# Patient Record
Sex: FEMALE | Race: WHITE | Employment: OTHER | URBAN - METROPOLITAN AREA
[De-identification: names, ages, dates, MRNs, and addresses within clinical notes are randomized per-mention and may not be internally consistent; named-entity substitution may affect disease eponyms.]

---

## 2022-11-21 RX ORDER — CALCIUM CARBONATE 500(1250)
500 TABLET ORAL DAILY
COMMUNITY

## 2022-11-21 RX ORDER — UBIDECARENONE 75 MG
50 CAPSULE ORAL DAILY
COMMUNITY

## 2022-11-21 RX ORDER — ROSUVASTATIN CALCIUM 10 MG/1
10 TABLET, COATED ORAL DAILY
COMMUNITY

## 2022-11-21 RX ORDER — M-VIT,TX,IRON,MINS/CALC/FOLIC 27MG-0.4MG
1 TABLET ORAL DAILY
COMMUNITY

## 2022-11-21 RX ORDER — ASPIRIN 81 MG/1
81 TABLET ORAL DAILY
Status: ON HOLD | COMMUNITY
End: 2022-11-26 | Stop reason: HOSPADM

## 2022-11-21 RX ORDER — SERTRALINE HYDROCHLORIDE 25 MG/1
25 TABLET, FILM COATED ORAL NIGHTLY
COMMUNITY

## 2022-11-21 RX ORDER — ACETAMINOPHEN 160 MG
TABLET,DISINTEGRATING ORAL DAILY
COMMUNITY

## 2022-11-21 NOTE — PROGRESS NOTES
[facility] PRE-ADMISSION TESTING INSTRUCTIONS    The Preadmission Testing patient is instructed accordingly using the following criteria (check applicable):    ARRIVAL INSTRUCTIONS:  [x] Parking the day of Surgery is located in the Main Entrance lot. Upon entering the door, make an immediate right to the surgery reception desk    [x] Bring photo ID and insurance card    [] Bring in a copy of Living will or Durable Power of  papers. [x] Please be sure to arrange for responsible adult to provide transportation to and from the hospital    [x] Please arrange for responsible adult to be with you for the 24 hour period post procedure due to having anesthesia    [x] If you awake am of surgery not feeling well or have temperature >100 please call [phone]    GENERAL INSTRUCTIONS:    [x] Nothing by mouth after midnight, including gum, candy, mints or water    [x] You may brush your teeth, but do not swallow any water    [] Take medications as instructed with 1-2 oz of water    [x] Stop herbal supplements and vitamins 5 days prior to procedure    [x] Follow preop dosing of blood thinners per physician instructions    [] Take 1/2 dose of evening insulin, but no insulin after midnight    [] No oral diabetic medications after midnight    [] If diabetic and have low blood sugar or feel symptomatic, take 1-2oz apple juice only    [] Bring inhalers day of surgery    [] Bring C-PAP/ Bi-Pap day of surgery    [] Bring urine specimen day of surgery    [x] Shower or bath with soap, lather and rinse well, AM of Surgery, no lotion, powders or creams to surgical site    [] Follow bowel prep as instructed per surgeon    [x] No tobacco products within 24 hours of surgery     [x] No alcohol or illegal drug use within 24 hours of surgery.     [x] Jewelry, body piercing's, eyeglasses, contact lenses and dentures are not permitted into surgery (bring cases)      [x] Please do not wear any nail polish, make up or hair products on the day of surgery    [x] You can expect a call the business day prior to procedure to notify you if your arrival time changes    [x] If you receive a survey after surgery we would greatly appreciate your comments    [] Parent/guardian of a minor must accompany their child and remain on the premises  the entire time they are under our care     [] Pediatric patients may bring favorite toy, blanket or comfort item with them    [] A caregiver or family member must remain with the patient during their stay if they are mentally handicapped, have dementia, disoriented or unable to use a call light or would be a safety concern if left unattended    [x] Please notify surgeon if you develop any illness between now and time of surgery (cold, cough, sore throat, fever, nausea, vomiting) or any signs of infections  including skin, wounds, and dental.    [x]  The Outpatient Pharmacy is available to fill your prescription here on your day of surgery, ask your preop nurse for details    [] Other instructions    EDUCATIONAL MATERIALS PROVIDED:    [] PAT Preoperative Education Packet/Booklet     [] Medication List    [] Transfusion bracelet applied with instructions    [] Shower with soap, lather and rinse well, and use CHG wipes provided the evening before surgery as instructed    [] Incentive spirometer with instructions

## 2022-11-25 ENCOUNTER — HOSPITAL ENCOUNTER (OUTPATIENT)
Dept: GENERAL RADIOLOGY | Age: 77
Setting detail: OUTPATIENT SURGERY
Discharge: HOME | End: 2022-11-27
Attending: GENERAL ACUTE CARE HOSPITAL
Payer: MEDICARE

## 2022-11-25 ENCOUNTER — HOSPITAL ENCOUNTER (INPATIENT)
Age: 77
LOS: 4 days | Discharge: HOME HEALTH | DRG: 517 | End: 2022-12-03
Attending: GENERAL ACUTE CARE HOSPITAL | Admitting: GENERAL ACUTE CARE HOSPITAL
Payer: MEDICARE

## 2022-11-25 ENCOUNTER — ANESTHESIA EVENT (OUTPATIENT)
Dept: OPERATING ROOM | Age: 77
End: 2022-11-25
Payer: MEDICARE

## 2022-11-25 ENCOUNTER — ANESTHESIA (OUTPATIENT)
Dept: OPERATING ROOM | Age: 77
End: 2022-11-25
Payer: MEDICARE

## 2022-11-25 DIAGNOSIS — R52 PAIN: ICD-10-CM

## 2022-11-25 DIAGNOSIS — S82.031D: Primary | ICD-10-CM

## 2022-11-25 LAB
EKG ATRIAL RATE: 64 BPM
EKG P AXIS: 46 DEGREES
EKG P-R INTERVAL: 156 MS
EKG Q-T INTERVAL: 428 MS
EKG QRS DURATION: 84 MS
EKG QTC CALCULATION (BAZETT): 441 MS
EKG R AXIS: 12 DEGREES
EKG T AXIS: 22 DEGREES
EKG VENTRICULAR RATE: 64 BPM

## 2022-11-25 PROCEDURE — 3209999900 FLUORO FOR SURGICAL PROCEDURES

## 2022-11-25 PROCEDURE — 6360000002 HC RX W HCPCS

## 2022-11-25 PROCEDURE — 93005 ELECTROCARDIOGRAM TRACING: CPT | Performed by: ANESTHESIOLOGY

## 2022-11-25 PROCEDURE — 2500000003 HC RX 250 WO HCPCS: Performed by: NURSE ANESTHETIST, CERTIFIED REGISTERED

## 2022-11-25 PROCEDURE — 6360000002 HC RX W HCPCS: Performed by: ANESTHESIOLOGY

## 2022-11-25 PROCEDURE — 0QSD04Z REPOSITION RIGHT PATELLA WITH INTERNAL FIXATION DEVICE, OPEN APPROACH: ICD-10-PCS | Performed by: GENERAL ACUTE CARE HOSPITAL

## 2022-11-25 PROCEDURE — 64447 NJX AA&/STRD FEMORAL NRV IMG: CPT | Performed by: ANESTHESIOLOGY

## 2022-11-25 PROCEDURE — C1713 ANCHOR/SCREW BN/BN,TIS/BN: HCPCS | Performed by: GENERAL ACUTE CARE HOSPITAL

## 2022-11-25 PROCEDURE — 2720000010 HC SURG SUPPLY STERILE: Performed by: GENERAL ACUTE CARE HOSPITAL

## 2022-11-25 PROCEDURE — 2709999900 HC NON-CHARGEABLE SUPPLY: Performed by: GENERAL ACUTE CARE HOSPITAL

## 2022-11-25 PROCEDURE — 7100000001 HC PACU RECOVERY - ADDTL 15 MIN: Performed by: GENERAL ACUTE CARE HOSPITAL

## 2022-11-25 PROCEDURE — 2580000003 HC RX 258: Performed by: GENERAL ACUTE CARE HOSPITAL

## 2022-11-25 PROCEDURE — 3700000001 HC ADD 15 MINUTES (ANESTHESIA): Performed by: GENERAL ACUTE CARE HOSPITAL

## 2022-11-25 PROCEDURE — G0378 HOSPITAL OBSERVATION PER HR: HCPCS

## 2022-11-25 PROCEDURE — 6360000002 HC RX W HCPCS: Performed by: GENERAL ACUTE CARE HOSPITAL

## 2022-11-25 PROCEDURE — 3600000013 HC SURGERY LEVEL 3 ADDTL 15MIN: Performed by: GENERAL ACUTE CARE HOSPITAL

## 2022-11-25 PROCEDURE — 97165 OT EVAL LOW COMPLEX 30 MIN: CPT

## 2022-11-25 PROCEDURE — 7100000000 HC PACU RECOVERY - FIRST 15 MIN: Performed by: GENERAL ACUTE CARE HOSPITAL

## 2022-11-25 PROCEDURE — 6370000000 HC RX 637 (ALT 250 FOR IP): Performed by: GENERAL ACUTE CARE HOSPITAL

## 2022-11-25 PROCEDURE — 3600000003 HC SURGERY LEVEL 3 BASE: Performed by: GENERAL ACUTE CARE HOSPITAL

## 2022-11-25 PROCEDURE — C1769 GUIDE WIRE: HCPCS | Performed by: GENERAL ACUTE CARE HOSPITAL

## 2022-11-25 PROCEDURE — 3700000000 HC ANESTHESIA ATTENDED CARE: Performed by: GENERAL ACUTE CARE HOSPITAL

## 2022-11-25 PROCEDURE — 2580000003 HC RX 258: Performed by: NURSE ANESTHETIST, CERTIFIED REGISTERED

## 2022-11-25 PROCEDURE — 6360000002 HC RX W HCPCS: Performed by: NURSE ANESTHETIST, CERTIFIED REGISTERED

## 2022-11-25 PROCEDURE — 93010 ELECTROCARDIOGRAM REPORT: CPT | Performed by: INTERNAL MEDICINE

## 2022-11-25 DEVICE — IMPLANTABLE DEVICE: Type: IMPLANTABLE DEVICE | Site: KNEE | Status: FUNCTIONAL

## 2022-11-25 RX ORDER — M-VIT,TX,IRON,MINS/CALC/FOLIC 27MG-0.4MG
1 TABLET ORAL DAILY
Status: DISCONTINUED | OUTPATIENT
Start: 2022-11-25 | End: 2022-12-03 | Stop reason: HOSPADM

## 2022-11-25 RX ORDER — SODIUM CHLORIDE 0.9 % (FLUSH) 0.9 %
10 SYRINGE (ML) INJECTION PRN
Status: DISCONTINUED | OUTPATIENT
Start: 2022-11-25 | End: 2022-12-03 | Stop reason: HOSPADM

## 2022-11-25 RX ORDER — NEOSTIGMINE METHYLSULFATE 1 MG/ML
INJECTION, SOLUTION INTRAVENOUS PRN
Status: DISCONTINUED | OUTPATIENT
Start: 2022-11-25 | End: 2022-11-25 | Stop reason: SDUPTHER

## 2022-11-25 RX ORDER — SODIUM CHLORIDE 0.9 % (FLUSH) 0.9 %
5-40 SYRINGE (ML) INJECTION EVERY 12 HOURS SCHEDULED
Status: DISCONTINUED | OUTPATIENT
Start: 2022-11-25 | End: 2022-11-25 | Stop reason: HOSPADM

## 2022-11-25 RX ORDER — MIDAZOLAM HYDROCHLORIDE 1 MG/ML
INJECTION INTRAMUSCULAR; INTRAVENOUS
Status: COMPLETED
Start: 2022-11-25 | End: 2022-11-25

## 2022-11-25 RX ORDER — SODIUM CHLORIDE 0.9 % (FLUSH) 0.9 %
5-40 SYRINGE (ML) INJECTION PRN
Status: DISCONTINUED | OUTPATIENT
Start: 2022-11-25 | End: 2022-12-03 | Stop reason: HOSPADM

## 2022-11-25 RX ORDER — ROPIVACAINE HYDROCHLORIDE 5 MG/ML
INJECTION, SOLUTION EPIDURAL; INFILTRATION; PERINEURAL
Status: COMPLETED
Start: 2022-11-25 | End: 2022-11-25

## 2022-11-25 RX ORDER — CALCIUM CARBONATE 500(1250)
500 TABLET ORAL DAILY
Status: DISCONTINUED | OUTPATIENT
Start: 2022-11-25 | End: 2022-12-03 | Stop reason: HOSPADM

## 2022-11-25 RX ORDER — MIDAZOLAM HYDROCHLORIDE 2 MG/2ML
1 INJECTION, SOLUTION INTRAMUSCULAR; INTRAVENOUS PRN
Status: DISCONTINUED | OUTPATIENT
Start: 2022-11-25 | End: 2022-11-25 | Stop reason: HOSPADM

## 2022-11-25 RX ORDER — SODIUM CHLORIDE 9 MG/ML
INJECTION, SOLUTION INTRAVENOUS CONTINUOUS PRN
Status: DISCONTINUED | OUTPATIENT
Start: 2022-11-25 | End: 2022-11-25 | Stop reason: SDUPTHER

## 2022-11-25 RX ORDER — ROSUVASTATIN CALCIUM 10 MG/1
10 TABLET, COATED ORAL DAILY
Status: DISCONTINUED | OUTPATIENT
Start: 2022-11-25 | End: 2022-12-03 | Stop reason: HOSPADM

## 2022-11-25 RX ORDER — FENTANYL CITRATE 50 UG/ML
INJECTION, SOLUTION INTRAMUSCULAR; INTRAVENOUS PRN
Status: DISCONTINUED | OUTPATIENT
Start: 2022-11-25 | End: 2022-11-25 | Stop reason: SDUPTHER

## 2022-11-25 RX ORDER — ENOXAPARIN SODIUM 100 MG/ML
40 INJECTION SUBCUTANEOUS DAILY
Status: DISCONTINUED | OUTPATIENT
Start: 2022-11-25 | End: 2022-11-27

## 2022-11-25 RX ORDER — SODIUM CHLORIDE 9 MG/ML
INJECTION, SOLUTION INTRAVENOUS PRN
Status: DISCONTINUED | OUTPATIENT
Start: 2022-11-25 | End: 2022-12-03 | Stop reason: HOSPADM

## 2022-11-25 RX ORDER — OXYCODONE HYDROCHLORIDE 5 MG/1
10 TABLET ORAL EVERY 4 HOURS PRN
Status: DISCONTINUED | OUTPATIENT
Start: 2022-11-25 | End: 2022-12-03 | Stop reason: HOSPADM

## 2022-11-25 RX ORDER — DIMETHICONE, OXYBENZONE, AND PADIMATE O 2; 2.5; 6.6 G/100G; G/100G; G/100G
STICK TOPICAL
Status: DISPENSED
Start: 2022-11-25 | End: 2022-11-26

## 2022-11-25 RX ORDER — MEPERIDINE HYDROCHLORIDE 25 MG/ML
12.5 INJECTION INTRAMUSCULAR; INTRAVENOUS; SUBCUTANEOUS EVERY 5 MIN PRN
Status: DISCONTINUED | OUTPATIENT
Start: 2022-11-25 | End: 2022-11-25 | Stop reason: HOSPADM

## 2022-11-25 RX ORDER — SODIUM CHLORIDE 0.9 % (FLUSH) 0.9 %
5-40 SYRINGE (ML) INJECTION EVERY 12 HOURS SCHEDULED
Status: DISCONTINUED | OUTPATIENT
Start: 2022-11-25 | End: 2022-12-03 | Stop reason: HOSPADM

## 2022-11-25 RX ORDER — FENTANYL CITRATE 50 UG/ML
INJECTION, SOLUTION INTRAMUSCULAR; INTRAVENOUS
Status: COMPLETED
Start: 2022-11-25 | End: 2022-11-25

## 2022-11-25 RX ORDER — ONDANSETRON 4 MG/1
4 TABLET, ORALLY DISINTEGRATING ORAL EVERY 8 HOURS PRN
Status: DISCONTINUED | OUTPATIENT
Start: 2022-11-25 | End: 2022-12-03 | Stop reason: HOSPADM

## 2022-11-25 RX ORDER — ROPIVACAINE HYDROCHLORIDE 5 MG/ML
30 INJECTION, SOLUTION EPIDURAL; INFILTRATION; PERINEURAL ONCE
Status: COMPLETED | OUTPATIENT
Start: 2022-11-25 | End: 2022-11-25

## 2022-11-25 RX ORDER — ONDANSETRON 2 MG/ML
4 INJECTION INTRAMUSCULAR; INTRAVENOUS
Status: COMPLETED | OUTPATIENT
Start: 2022-11-25 | End: 2022-11-25

## 2022-11-25 RX ORDER — PROPOFOL 10 MG/ML
INJECTION, EMULSION INTRAVENOUS PRN
Status: DISCONTINUED | OUTPATIENT
Start: 2022-11-25 | End: 2022-11-25 | Stop reason: SDUPTHER

## 2022-11-25 RX ORDER — ROCURONIUM BROMIDE 10 MG/ML
INJECTION, SOLUTION INTRAVENOUS PRN
Status: DISCONTINUED | OUTPATIENT
Start: 2022-11-25 | End: 2022-11-25 | Stop reason: SDUPTHER

## 2022-11-25 RX ORDER — MORPHINE SULFATE 4 MG/ML
4 INJECTION, SOLUTION INTRAMUSCULAR; INTRAVENOUS
Status: DISCONTINUED | OUTPATIENT
Start: 2022-11-25 | End: 2022-12-03 | Stop reason: HOSPADM

## 2022-11-25 RX ORDER — SODIUM CHLORIDE 9 MG/ML
INJECTION, SOLUTION INTRAVENOUS PRN
Status: DISCONTINUED | OUTPATIENT
Start: 2022-11-25 | End: 2022-11-25 | Stop reason: HOSPADM

## 2022-11-25 RX ORDER — SODIUM CHLORIDE 9 MG/ML
INJECTION, SOLUTION INTRAVENOUS CONTINUOUS
Status: DISCONTINUED | OUTPATIENT
Start: 2022-11-25 | End: 2022-12-03 | Stop reason: HOSPADM

## 2022-11-25 RX ORDER — ONDANSETRON 2 MG/ML
INJECTION INTRAMUSCULAR; INTRAVENOUS PRN
Status: DISCONTINUED | OUTPATIENT
Start: 2022-11-25 | End: 2022-11-25 | Stop reason: SDUPTHER

## 2022-11-25 RX ORDER — POLYETHYLENE GLYCOL 3350 17 G/17G
17 POWDER, FOR SOLUTION ORAL DAILY PRN
Status: DISCONTINUED | OUTPATIENT
Start: 2022-11-25 | End: 2022-12-03 | Stop reason: HOSPADM

## 2022-11-25 RX ORDER — POLYETHYLENE GLYCOL 3350 17 G/17G
17 POWDER, FOR SOLUTION ORAL DAILY
Status: DISCONTINUED | OUTPATIENT
Start: 2022-11-25 | End: 2022-12-03 | Stop reason: HOSPADM

## 2022-11-25 RX ORDER — POTASSIUM CHLORIDE 20 MEQ/1
40 TABLET, EXTENDED RELEASE ORAL PRN
Status: DISCONTINUED | OUTPATIENT
Start: 2022-11-25 | End: 2022-12-03 | Stop reason: HOSPADM

## 2022-11-25 RX ORDER — CHOLECALCIFEROL (VITAMIN D3) 50 MCG
2000 TABLET ORAL DAILY
Status: DISCONTINUED | OUTPATIENT
Start: 2022-11-25 | End: 2022-12-03 | Stop reason: HOSPADM

## 2022-11-25 RX ORDER — DEXAMETHASONE SODIUM PHOSPHATE 4 MG/ML
INJECTION, SOLUTION INTRA-ARTICULAR; INTRALESIONAL; INTRAMUSCULAR; INTRAVENOUS; SOFT TISSUE PRN
Status: DISCONTINUED | OUTPATIENT
Start: 2022-11-25 | End: 2022-11-25 | Stop reason: SDUPTHER

## 2022-11-25 RX ORDER — HYDRALAZINE HYDROCHLORIDE 20 MG/ML
INJECTION INTRAMUSCULAR; INTRAVENOUS PRN
Status: DISCONTINUED | OUTPATIENT
Start: 2022-11-25 | End: 2022-11-25 | Stop reason: SDUPTHER

## 2022-11-25 RX ORDER — SODIUM CHLORIDE 0.9 % (FLUSH) 0.9 %
5-40 SYRINGE (ML) INJECTION PRN
Status: DISCONTINUED | OUTPATIENT
Start: 2022-11-25 | End: 2022-11-25 | Stop reason: HOSPADM

## 2022-11-25 RX ORDER — LIDOCAINE HYDROCHLORIDE 20 MG/ML
INJECTION, SOLUTION EPIDURAL; INFILTRATION; INTRACAUDAL; PERINEURAL PRN
Status: DISCONTINUED | OUTPATIENT
Start: 2022-11-25 | End: 2022-11-25 | Stop reason: SDUPTHER

## 2022-11-25 RX ORDER — ACETAMINOPHEN 650 MG/1
650 SUPPOSITORY RECTAL EVERY 6 HOURS PRN
Status: DISCONTINUED | OUTPATIENT
Start: 2022-11-25 | End: 2022-12-03 | Stop reason: HOSPADM

## 2022-11-25 RX ORDER — ONDANSETRON 2 MG/ML
4 INJECTION INTRAMUSCULAR; INTRAVENOUS EVERY 6 HOURS PRN
Status: DISCONTINUED | OUTPATIENT
Start: 2022-11-25 | End: 2022-12-03 | Stop reason: HOSPADM

## 2022-11-25 RX ORDER — ACETAMINOPHEN 325 MG/1
650 TABLET ORAL EVERY 6 HOURS PRN
Status: DISCONTINUED | OUTPATIENT
Start: 2022-11-25 | End: 2022-12-03 | Stop reason: HOSPADM

## 2022-11-25 RX ORDER — LABETALOL HYDROCHLORIDE 5 MG/ML
INJECTION, SOLUTION INTRAVENOUS PRN
Status: DISCONTINUED | OUTPATIENT
Start: 2022-11-25 | End: 2022-11-25 | Stop reason: SDUPTHER

## 2022-11-25 RX ORDER — UBIDECARENONE 75 MG
50 CAPSULE ORAL DAILY
Status: DISCONTINUED | OUTPATIENT
Start: 2022-11-25 | End: 2022-12-03 | Stop reason: HOSPADM

## 2022-11-25 RX ORDER — OXYCODONE HYDROCHLORIDE 5 MG/1
5 TABLET ORAL EVERY 4 HOURS PRN
Status: DISCONTINUED | OUTPATIENT
Start: 2022-11-25 | End: 2022-12-03 | Stop reason: HOSPADM

## 2022-11-25 RX ORDER — POTASSIUM CHLORIDE 7.45 MG/ML
10 INJECTION INTRAVENOUS PRN
Status: DISCONTINUED | OUTPATIENT
Start: 2022-11-25 | End: 2022-12-03 | Stop reason: HOSPADM

## 2022-11-25 RX ORDER — MORPHINE SULFATE 2 MG/ML
2 INJECTION, SOLUTION INTRAMUSCULAR; INTRAVENOUS
Status: DISCONTINUED | OUTPATIENT
Start: 2022-11-25 | End: 2022-12-03 | Stop reason: HOSPADM

## 2022-11-25 RX ORDER — FENTANYL CITRATE 50 UG/ML
50 INJECTION, SOLUTION INTRAMUSCULAR; INTRAVENOUS EVERY 10 MIN PRN
Status: DISCONTINUED | OUTPATIENT
Start: 2022-11-25 | End: 2022-11-25 | Stop reason: HOSPADM

## 2022-11-25 RX ORDER — GLYCOPYRROLATE 0.2 MG/ML
INJECTION INTRAMUSCULAR; INTRAVENOUS PRN
Status: DISCONTINUED | OUTPATIENT
Start: 2022-11-25 | End: 2022-11-25 | Stop reason: SDUPTHER

## 2022-11-25 RX ORDER — SODIUM CHLORIDE 9 MG/ML
INJECTION, SOLUTION INTRAVENOUS PRN
Status: DISCONTINUED | OUTPATIENT
Start: 2022-11-25 | End: 2022-11-25 | Stop reason: SDUPTHER

## 2022-11-25 RX ADMIN — LABETALOL HYDROCHLORIDE 5 MG: 5 INJECTION INTRAVENOUS at 11:25

## 2022-11-25 RX ADMIN — Medication 3 MG: at 11:20

## 2022-11-25 RX ADMIN — SODIUM CHLORIDE: 9 INJECTION, SOLUTION INTRAVENOUS at 09:37

## 2022-11-25 RX ADMIN — SODIUM CHLORIDE, PRESERVATIVE FREE 10 ML: 5 INJECTION INTRAVENOUS at 22:12

## 2022-11-25 RX ADMIN — FENTANYL CITRATE 50 MCG: 50 INJECTION, SOLUTION INTRAMUSCULAR; INTRAVENOUS at 08:59

## 2022-11-25 RX ADMIN — WATER 2000 MG: 1 INJECTION INTRAMUSCULAR; INTRAVENOUS; SUBCUTANEOUS at 22:12

## 2022-11-25 RX ADMIN — DEXAMETHASONE SODIUM PHOSPHATE 8 MG: 4 INJECTION, SOLUTION INTRAMUSCULAR; INTRAVENOUS at 10:01

## 2022-11-25 RX ADMIN — ONDANSETRON 4 MG: 2 INJECTION INTRAMUSCULAR; INTRAVENOUS at 12:31

## 2022-11-25 RX ADMIN — ASPIRIN 325 MG: 325 TABLET, COATED ORAL at 22:11

## 2022-11-25 RX ADMIN — LIDOCAINE HYDROCHLORIDE 60 MG: 20 INJECTION, SOLUTION EPIDURAL; INFILTRATION; INTRACAUDAL; PERINEURAL at 09:52

## 2022-11-25 RX ADMIN — WATER 2000 MG: 1 INJECTION INTRAMUSCULAR; INTRAVENOUS; SUBCUTANEOUS at 10:06

## 2022-11-25 RX ADMIN — ONDANSETRON 4 MG: 2 INJECTION INTRAMUSCULAR; INTRAVENOUS at 11:17

## 2022-11-25 RX ADMIN — FENTANYL CITRATE 50 MCG: 50 INJECTION, SOLUTION INTRAMUSCULAR; INTRAVENOUS at 09:51

## 2022-11-25 RX ADMIN — GLYCOPYRROLATE 0.6 MG: 0.2 INJECTION, SOLUTION INTRAMUSCULAR; INTRAVENOUS at 11:20

## 2022-11-25 RX ADMIN — ROPIVACAINE HYDROCHLORIDE 30 ML: 5 INJECTION, SOLUTION EPIDURAL; INFILTRATION; PERINEURAL at 09:11

## 2022-11-25 RX ADMIN — ROCURONIUM BROMIDE 30 MG: 10 INJECTION, SOLUTION INTRAVENOUS at 09:52

## 2022-11-25 RX ADMIN — FENTANYL CITRATE 50 MCG: 50 INJECTION INTRAMUSCULAR; INTRAVENOUS at 08:59

## 2022-11-25 RX ADMIN — MIDAZOLAM HYDROCHLORIDE 1 MG: 2 INJECTION, SOLUTION INTRAMUSCULAR; INTRAVENOUS at 08:59

## 2022-11-25 RX ADMIN — SODIUM CHLORIDE: 9 INJECTION, SOLUTION INTRAVENOUS at 10:57

## 2022-11-25 RX ADMIN — LABETALOL HYDROCHLORIDE 5 MG: 5 INJECTION INTRAVENOUS at 11:31

## 2022-11-25 RX ADMIN — MIDAZOLAM 1 MG: 1 INJECTION INTRAMUSCULAR; INTRAVENOUS at 08:59

## 2022-11-25 RX ADMIN — PROPOFOL 160 MG: 10 INJECTION, EMULSION INTRAVENOUS at 09:52

## 2022-11-25 RX ADMIN — HYDRALAZINE HYDROCHLORIDE 5 MG: 20 INJECTION, SOLUTION INTRAMUSCULAR; INTRAVENOUS at 10:59

## 2022-11-25 RX ADMIN — FENTANYL CITRATE 50 MCG: 50 INJECTION, SOLUTION INTRAMUSCULAR; INTRAVENOUS at 10:49

## 2022-11-25 ASSESSMENT — PAIN - FUNCTIONAL ASSESSMENT: PAIN_FUNCTIONAL_ASSESSMENT: 0-10

## 2022-11-25 ASSESSMENT — PAIN DESCRIPTION - DESCRIPTORS: DESCRIPTORS: SORE

## 2022-11-25 NOTE — ANESTHESIA POSTPROCEDURE EVALUATION
Department of Anesthesiology  Postprocedure Note    Patient: Sugar Camargo  MRN: [id number]  YOB: 1945  Date of evaluation: 11/25/2022      Procedure Summary     Date: 11/25/22 Room / Location: [facility] OR 07 / [facility]    Anesthesia Start: 1944 Anesthesia Stop: 5415    Procedure: RIGHT PATELLA OPEN REDUCTION INTERNAL FIXATION  ++PNB++ (Right: Knee) Diagnosis:       Closed displaced transverse fracture of right patella, initial encounter      (Closed displaced transverse fracture of right patella, initial encounter [S82.031A])    Surgeons: Alannah Phelps DO Responsible Provider: Gerri Osuna DO    Anesthesia Type: Regional, General ASA Status: 2          Anesthesia Type: Regional, General    Neftaly Phase I: Neftaly Score: 10    Neftaly Phase II:        Anesthesia Post Evaluation    Patient location during evaluation: PACU  Patient participation: complete - patient participated  Level of consciousness: awake  Pain score: 0  Airway patency: patent  Nausea & Vomiting: no nausea and no vomiting  Complications: no  Cardiovascular status: hemodynamically stable  Respiratory status: acceptable  Hydration status: euvolemic  Comments: Pt transferred to floor. RN states no c/o pain at time of transfer and no questions/concerns r/t anesthesia.

## 2022-11-25 NOTE — PROGRESS NOTES
Occupational Therapy    OCCUPATIONAL THERAPY INITIAL EVALUATION     [facility] [address] [place] [address]         JVIO:                                                  Patient Name: Marcin Carrington    MRN: [id number]    : 1945    Room: [address] [place] [address]      Evaluating OT: Earnestine Saldana OTR/L   [id number]      Referring Provider:Raad Sahu DO    Specific Provider Orders/Date:OT eval and treat 2022      Diagnosis:  Closed displaced transverse fracture of right patella, initial encounter [S82.031A]  Closed disp transverse fracture of right patella with routine healing [S82.031D]  sustained a fall from standing height in which she fell directly onto a flexed right knee.   X-rays revealed a completely displaced transverse fracture at the mid pole of the patella  2022  family has been staying with her to assist since then - they live out of town and have returned home    Procedure(s):  [address] [place] [address] 2022    Precautions:  Fall Risk, TTWB R LE, knee immobilizer      Assessment of current deficits    [x] Functional mobility  [x]ADLs  [x] Strength               []Cognition    [x] Functional transfers   [x] IADLs         [x] Safety Awareness   [x]Endurance    [] Fine Coordination              [x] Balance      [] Vision/perception   []Sensation     []Gross Motor Coordination  [] ROM  [] Delirium                   [] Motor Control     OT PLAN OF CARE   OT POC based on physician orders, patient diagnosis and results of clinical assessment    Frequency/Duration  2-4 days/wk for 2 weeks PRN   Specific OT Treatment Interventions to include:   ADL retraining/adapted techniques and AE recommendations to increase functional independence within precautions                    Energy conservation techniques to improve tolerance for selfcare routine   Functional transfer/mobility training/DME recommendations for increased independence, safety and fall prevention         Patient/family education to increase safety and functional independence             Environmental modifications for safe mobility and completion of ADLs                             Therapeutic activity to improve functional performance during ADLs. Therapeutic exercise to improve tolerance and functional strength for ADLs    Balance retraining/tolerance tasks for facilitation of postural control with dynamic challenges during ADLs .       Positioning to improve functional independence  []    Recommended Adaptive Equipment: TBD     Home Living: Pt lives alone, 1 story with 4 steps/rail    Bathroom setup: walk in shower, grab bars, shower seat    Equipment owned: wheeled walker     Prior Level of Function: assist with ADLs , assist  with IADLs; ambulated with walker     Pain Level: no pain ;   Cognition: A&O: 4/4;    Memory:  goood    Sequencing:  good    Problem solving:  good    Judgement/safety:  good      Functional Assessment:  AM-PAC Daily Activity Raw Score: 16/24   Initial Eval Status  Date: 11/25/22 Treatment Status  Date: STGs = LTGs  Time frame: 10-14 days   Feeding Independent      Grooming Set-up ,seated   Independent    UB Dressing Set-up   Independent    LB Dressing Mod A   Mod I    Bathing Min A   Mod I    Toileting SBA  Seated on commode   Mod I    Bed Mobility  SBA  Supine <> sit   Mod I    Functional Transfers CGA  Sit - stand from bed, BSC   Mod I    Functional Mobility CGA,w/walker   Knee immobilizer on   SPT to<> from bed/BSC  Able to side step to [facility] - patient educated on TTWB - leary of putting to much weight down so patient stayed NWB and hopped the short distance   Continue to reinforce TTWB precaution   Mod I  with good tolerance    Balance Sitting:     Static:  Independent     Dynamic:SBA   Standing: CGA   Independent    Activity Tolerance No SOB   Good  with ADL activity    Visual/  Perceptual Glasses: no                Hand Dominance right   AROM (PROM) Strength Additional Info:    RUE  WFL WFL good  and wfl FMC/dexterity noted during ADL tasks       LUE WFL WFL good  and wfl FMC/dexterity noted during ADL tasks       Hearing: WFL   Sensation:  No c/o numbness or tingling   Tone: WFL   Edema: none observed     Comments: Upon arrival patient lying in bed . At end of session, patient returned to bed  with call light and phone within reach, all lines and tubes intact. *ALARM ON     Overall patient demonstrated  decreased independence and safety during completion of ADL/functional transfer/mobility tasks. Pt would benefit from continued skilled OT to increase safety and independence with completion of ADL/IADL tasks for functional independence and quality of life. Rehab Potential: good for established goals     Patient / Family Goal: rehab   - patient nervous about returning home alone - her family was assisting her prior - they are from out of town and have returned home     Patient and/or family were instructed on functional diagnosis, prognosis/goals and OT plan of care. Demonstrated good  understanding. Eval Complexity: Low    Time In: 1530  Time Out: 1550      Min Units   OT Eval Low 97165 x  1   OT Eval Medium 37491      OT Eval High 40794      OT Re-Eval [id number]       Therapeutic Ex 74300      Therapeutic Activities 35914       ADL/Self Care 48805       Orthotic Management 86384       Manual [id number]     Neuro Re-Ed 41904       Non-Billable Time         Evaluation Time additionally includes thorough review of current medical information, gathering information on past medical history/social history and prior level of function, interpretation of standardized testing/informal observation of tasks, assessment of data and development of plan of care and goals.             Kaylin Singh  OTR/L  OT [id number]

## 2022-11-25 NOTE — ANESTHESIA PRE PROCEDURE
Department of Anesthesiology  Preprocedure Note       Name:  Miller Hill   Age:  69 y.o.  :  1945                                          MRN:  [id number]         Date:  2022      Surgeon: Laura Jeter):  Mook Benjamin DO    Procedure: Procedure(s):  RIGHT PATELLA OPEN REDUCTION INTERNAL FIXATION  ++ARTHREX++  ++PNB++    Medications prior to admission:   Prior to Admission medications    Medication Sig Start Date End Date Taking?  Authorizing Provider   rosuvastatin (CRESTOR) 10 MG tablet Take 10 mg by mouth daily   Yes Historical Provider, MD   sertraline (ZOLOFT) 25 MG tablet Take 25 mg by mouth at bedtime   Yes Historical Provider, MD   Multiple Vitamins-Minerals (THERAPEUTIC MULTIVITAMIN-MINERALS) tablet Take 1 tablet by mouth daily   Yes Historical Provider, MD   aspirin 81 MG EC tablet Take 81 mg by mouth daily   Yes Historical Provider, MD   Cholecalciferol (VITAMIN D3) 50 MCG (2000) CAPS Take by mouth daily   Yes Historical Provider, MD   COLLAGEN PO Take by mouth daily   Yes Historical Provider, MD   calcium carbonate (OSCAL) 500 MG TABS tablet Take 500 mg by mouth daily   Yes Historical Provider, MD   vitamin B-12 (CYANOCOBALAMIN) 100 MCG tablet Take 50 mcg by mouth daily   Yes Historical Provider, MD       Current medications:    Current Facility-Administered Medications   Medication Dose Route Frequency Provider Last Rate Last Admin    sodium chloride flush 0.9 % injection 5-40 mL  5-40 mL IntraVENous 2 times per day Mook Benjamin DO        sodium chloride flush 0.9 % injection 5-40 mL  5-40 mL IntraVENous PRN Mook Benjamin, DO        0.9 % sodium chloride infusion   IntraVENous PRN Mook Benjamin,         ceFAZolin (ANCEF) 2,000 mg in sterile water 20 mL IV syringe  2,000 mg IntraVENous On Call to [address], DO        midazolam (VERSED) 2 MG/2ML injection             ropivacaine (NAROPIN) 0.5% injection             fentaNYL (SUBLIMAZE) injection 50 mcg  50 mcg IntraVENous Q10 Min PRN Marden Cap Erlen, DO        midazolam PF (VERSED) injection 1 mg  1 mg IntraVENous PRN Marden Cap Herbert, DO        ropivacaine (NAROPIN) 0.5% injection 30 mL  30 mL Infiltration Once Yaquelin Hoots, DO           Allergies:  No Known Allergies    Problem List:  There is no problem list on file for this patient. Past Medical History:        Diagnosis Date    Arthritis     Fracture of right patella     Hx of blood clots     Hyperlipidemia     PONV (postoperative nausea and vomiting)        Past Surgical History:        Procedure Laterality Date    CATARACT EXTRACTION EXTRACAPSULAR W/ INTRAOCULAR LENS IMPLANTATION Bilateral     COLONOSCOPY      TONSILLECTOMY         Social History:    Social History     Tobacco Use    Smoking status: Former     Types: Cigarettes    Smokeless tobacco: Never   Substance Use Topics    Alcohol use: Never                                Counseling given: Not Answered      Vital Signs (Current):   Vitals:    11/21/22 1350 11/25/22 0714   BP:  (!) 150/67   Pulse:  76   Resp:  16   Temp:  97.4 °F (36.3 °C)   SpO2:  95%   Weight: 162 lb (73.5 kg) 162 lb (73.5 kg)   Height: 5' 6\" (1.676 m) 5' 6\" (1.676 m)                                              BP Readings from Last 3 Encounters:   11/25/22 (!) 150/67       NPO Status: Time of last liquid consumption: 1800                        Time of last solid consumption: 1800                        Date of last liquid consumption: 11/24/22                        Date of last solid food consumption: 11/24/22    BMI:   Wt Readings from Last 3 Encounters:   11/25/22 162 lb (73.5 kg)     Body mass index is 26.15 kg/m².     CBC: No results found for: WBC, RBC, HGB, HCT, MCV, RDW, PLT    CMP: No results found for: NA, K, CL, CO2, BUN, CREATININE, GFRAA, AGRATIO, LABGLOM, GLUCOSE, GLU, PROT, CALCIUM, BILITOT, ALKPHOS, AST, ALT    POC Tests: No results for input(s): POCGLU, POCNA, POCK, POCCL, POCBUN, Nathaly Gonsalezer in the last 72 hours. Coags: No results found for: PROTIME, INR, APTT    HCG (If Applicable): No results found for: PREGTESTUR, PREGSERUM, HCG, HCGQUANT     ABGs: No results found for: PHART, PO2ART, UGC4ERE, CRR8TUZ, BEART, Y1OGUUZG     Type & Screen (If Applicable):  No results found for: LABABO, LABRH    Drug/Infectious Status (If Applicable):  No results found for: HIV, HEPCAB    COVID-19 Screening (If Applicable): No results found for: COVID19        Anesthesia Evaluation  Patient summary reviewed and Nursing notes reviewed   history of anesthetic complications: PONV. Airway: Mallampati: II  TM distance: >3 FB   Neck ROM: full  Mouth opening: > = 3 FB   Dental: normal exam         Pulmonary:Negative Pulmonary ROS breath sounds clear to auscultation                             Cardiovascular:Negative CV ROS            Rhythm: regular  Rate: normal                    Neuro/Psych:   (+) depression/anxiety             GI/Hepatic/Renal: Neg GI/Hepatic/Renal ROS            Endo/Other:                      ROS comment: S/P fall resulting in Rt. Patella fx Abdominal:             Vascular: negative vascular ROS. Other Findings:           Anesthesia Plan      general and regional     ASA 2     (Discussed with pt. R & B of Rt. Femoral nerve block for postop pain, sg request.  Pt. Agrees to this.)        Anesthetic plan and risks discussed with patient. Plan discussed with CRNA. Nikki Biggs DO   11/25/2022      Patient seen and examined, chart reviewed, agree with above findings. Anesthetic plan, risks, benefits, alternatives, and personnel involved discussed with patient. Patient verbalized an understanding and agreed to proceed. NPO status confirmed. Anesthetic plan discussed with care team members and agreed upon. Nikki iBggs DO   11/25/2022  9:17 AM      Pt seen and evaluated pre-procedure.   Risks and benefits of anesthetic plan discussed as per custom.    RACHELLE Clements - CRNA

## 2022-11-25 NOTE — H&P
History and Physical      CHIEF COMPLAINT: Right knee pain/patella fracture    History of Present Illness:  Marcin Carrington is a 69y.o. year-old female presents for ORIF of the right patella. This patient was previously seen in my office. She had sustained a fall from standing height in which she fell directly onto a flexed right knee. X-rays revealed a completely displaced transverse fracture at the mid pole of the patella. Surgery was subsequently recommended. She has been partially weightbearing with the leg immobilized in a knee immobilizer. She denies any change in symptoms. No recent fevers, chills, nausea or vomiting. Past Medical History:   Diagnosis Date    Arthritis     Fracture of right patella     Hx of blood clots     Hyperlipidemia     PONV (postoperative nausea and vomiting)          Past Surgical History:   Procedure Laterality Date    CATARACT EXTRACTION EXTRACAPSULAR W/ INTRAOCULAR LENS IMPLANTATION Bilateral     COLONOSCOPY      TONSILLECTOMY         Medications Prior to Admission:    Prior to Admission medications    Medication Sig Start Date End Date Taking?  Authorizing Provider   rosuvastatin (CRESTOR) 10 MG tablet Take 10 mg by mouth daily   Yes Historical Provider, MD   sertraline (ZOLOFT) 25 MG tablet Take 25 mg by mouth at bedtime   Yes Historical Provider, MD   Multiple Vitamins-Minerals (THERAPEUTIC MULTIVITAMIN-MINERALS) tablet Take 1 tablet by mouth daily   Yes Historical Provider, MD   aspirin 81 MG EC tablet Take 81 mg by mouth daily   Yes Historical Provider, MD   Cholecalciferol (VITAMIN D3) 50 MCG (2000 UT) CAPS Take by mouth daily   Yes Historical Provider, MD   COLLAGEN PO Take by mouth daily   Yes Historical Provider, MD   calcium carbonate (OSCAL) 500 MG TABS tablet Take 500 mg by mouth daily   Yes Historical Provider, MD   vitamin B-12 (CYANOCOBALAMIN) 100 MCG tablet Take 50 mcg by mouth daily   Yes Historical Provider, MD       Allergies:    Patient has no known allergies. Social History:    reports that she has quit smoking. Her smoking use included cigarettes. She has never used smokeless tobacco. She reports that she does not drink alcohol and does not use drugs. Family History:   family history is not on file. REVIEW OF SYSTEMS:  As above in the HPI, otherwise negative    PHYSICAL EXAM:    Vitals:  BP (!) 140/75   Pulse 65   Temp 97.4 °F (36.3 °C)   Resp 18   Ht 5' 6\" (1.676 m)   Wt 162 lb (73.5 kg)   SpO2 98%   BMI 26.15 kg/m²     General:  Awake, alert, oriented X 3. Well developed, well nourished, well groomed. No apparent distress. HEENT:  Normocephalic, atraumatic. Pupils equal, round, reactive to light. No scleral icterus. No conjunctival injection. Normal lips, teeth, and gums. No nasal discharge. Neck:  Supple No palpable cervical or supraclavicular nodes. Carotids brisk in upstroke , without carotid bruits. No abnormal JVP at 45°. Thyroid not palpable. Chest: Even excursion. Heart:  Regular regular, S1> S2 no murmurs, gallops, or rubs. PMI 5th intercostal space midclavicular line. Lungs: CTA bilaterally, bilat symmetrical expansion, no wheeze, rales, or rhonchi. No decreased tactile fremitus. Abdomen: Bowel sounds present, soft, nontender, no masses, no organomegaly, no peritoneal signs  Extremities: Preoperative nerve block performed by anesthesia prior to my arrival.  Skin is circumferentially intact and all compartments are soft compressible. Strength and sensation is appropriate limited. Skin: Warm and dry, no open lesions or rash  Neuro:  Cranial nerves 2-12 intact, no focal sensory or motor deficits  Breast: deferred  Rectal: deferred  Genitalia:  deferred    LABS:  CBC: No results found for: WBC, RBC, HGB, HCT, MCV, MCH, MCHC, RDW, PLT, MPV      ASSESSMENT:      There is no problem list on file for this patient.       PLAN:    As mentioned, we obtained x-rays in my office which revealed a complete transverse patella fracture with significant displacement. Today, plan is to proceed with open duction internal fixation using 2 cannulated screws and a tension band construct with suture tape. The surgery was discussed at length in my office. All risk, benefits alternatives treatment including but not limited to loss of life, loss of limb, bleeding, infection, damage to surrounding structures and potential need for further surgery were reviewed. Informed sent was obtained and verified. We will keep the patient overnight tonight. She will undergo 24 hours of prophylactic antibiotics as well as assessment by PT and OT. Plans for her to discharge home with home therapy and home health. She will be touchdown weightbearing only with the leg locked in extension in an immobilizer/hinged knee brace. We will see her back in the office in 2 weeks.     Tatum Palacios DO   9:26 AM  11/25/2022

## 2022-11-25 NOTE — OP NOTE
Operative Note      Patient: Marcin Carrington  YOB: 1945  MRN: [id number]    Date of Procedure: 2022    Pre-Op Diagnosis: Closed displaced transverse fracture of right patella, initial encounter [H03.266E]    Post-Op Diagnosis: Same       Procedure(s):  RIGHT PATELLA OPEN REDUCTION INTERNAL FIXATION  ++PNB++    Surgeon(s):  Randolph Ambriz DO    Assistant:   Surgical Assistant: Oriemily Dewey    Anesthesia: General    Estimated Blood Loss (mL): less than 50     Complications: None    Specimens:   * No specimens in log *    Implants:  Implant Name Type Inv. Item Serial No.  Lot No. LRB No. Used Action   SCREW BNE L40MM DIA4MM PAT SANAZ BLNT TIP LO PROF FOR [employer] - [id number]  SCREW BNE L40MM DIA4MM PAT SANAZ BLNT TIP LO PROF FOR FRAC  [employer]WD  Right 1 Implanted   SCREW BNE L36MM DIA4MM PAT SANAZ BLNT TIP LO PROF FOR [employer] - [id number]  SCREW BNE L36MM DIA4MM PAT SANAZ BLNT TIP LO PROF FOR FRAC  [employer]  Right 1 Implanted         Drains: * No LDAs found *    Findings: Complete, transverse fracture of the right patella at the mid pole    Detailed Description of Procedure:   Patient was greeted in the preoperative holding area after a regional pain block was performed by the anesthesia staff. All final questions were answered. The correct operative lower extremity was marked with an indelible skin marker. The patient was then taken back to the operative suite and placed in the supine position on the operating table. A dose of preoperative antibiotics was given. General anesthesia was induced per the anesthesia team.  A pretested pneumatic tourniquet was applied to the proximal aspect of the operative lower extremity. The operative lower extremities and prepped and draped in a standard orthopedic fashion. A timeout was performed which the patient operative extremity as well as the procedure were confirmed by all parties present.     I began by making a longitudinal incision directly over the central aspect of the patella with a #10 scalpel blade. The incision was carried through subcutaneous tissues and any bleeding vessels were coagulated using Bovie cautery. Identified the patella. There was pseudocapsule and scar which had formed over the periosteum. This was incised longitudinally with the Bovie cautery. I then identified a transverse fracture at the midpoint of the patella. Using a combination of curettes as well as a synovial rongeur, the fracture site was debrided. I copiously irrigated into the joint using normal saline and suction to remove any debris. Then, using a pointed reduction clamp, I was able to achieve a anatomic reduction at the fracture site. This was confirmed on the AP and lateral x-ray with fluoroscopy. Using a 4.5 mm K wire, I then passed this from proximal to distal in the midpoint of the patella on the lateral x-ray. The wire was passed parallel to the articular surface, with care taken to avoid penetration intra-articularly. A second wire was passed parallel to this. The wires were positioned to allow for appropriate measurement for cannulated screw insertion. This was done using the depth gauge. I then used the appropriate drill, over drilling each wire to the level of the far cortex. Then, 2 partially-threaded screws were passed over each of the wires and sequentially tensioned. There was good cortical fixation upon placement of each screw. Finally, a suture tape was passed through each of the screws and a tension band type fashion. A secure knot was then tied, burying the knot stack at the inferolateral corner of the patella so as to avoid superficial soft tissue irritation. The knot was tied with interrupted half hitches. Following this, I was able to remove the pointed reduction clamp and anatomic reduction was maintained at the fracture site. Final imaging was then taken. The wound was copiously irrigated using normal saline.   A standard layered closure was then performed. I used #1 Vicryl to close over the periosteal tissue and 2-0 Vicryl suture/3-0 Monocryl to close the skin incision. Dermabond and Steri-Strips were applied as was a sterile Aquacel dressing. The knee was immobilized into a knee immobilizer. Anesthesia was reversed as the tourniquet was deflated and patient was taken into the recovery in stable condition. There were no apparent complications. Disposition: Patient will be kept overnight for observation. She will undergo standard 24 hours of prophylactic antibiotics. We are going to start her on aspirin 325 twice daily for DVT prophylaxis. We will order PT/OT while she is admitted.     Electronically signed by Haim Horvath DO on 11/25/2022 at 11:49 AM

## 2022-11-25 NOTE — ANESTHESIA PROCEDURE NOTES
Peripheral Block    Patient location during procedure: procedure area  Reason for block: post-op pain management and at surgeon's request  Start time: 11/25/2022 9:02 AM  End time: 11/25/2022 9:11 AM  Staffing  Anesthesiologist: Tino Nathan DO  Preanesthetic Checklist  Completed: patient identified, IV checked, site marked, risks and benefits discussed, surgical/procedural consents, equipment checked, pre-op evaluation, timeout performed, anesthesia consent given, oxygen available and monitors applied/VS acknowledged  Peripheral Block   Patient position: supine  Prep: ChloraPrep  Provider prep: mask and sterile gloves  Patient monitoring: cardiac monitor, continuous pulse ox, frequent blood pressure checks, IV access and oxygen  Block type: Femoral  Laterality: right  Injection technique: single-shot  Guidance: nerve stimulator and ultrasound guided  Local infiltration: ropivacaine  Infiltration strength: 0.5 %  Local infiltration: ropivacaine  Dose: 30 mL    Needle   Needle type: insulated echogenic nerve stimulator needle (Stimuplex)   Needle localization: nerve stimulator and ultrasound guidance  Assessment   Injection assessment: negative aspiration for heme, no paresthesia on injection and local visualized surrounding nerve on ultrasound  Slow fractionated injection: yes  Hemodynamics: stable  Real-time US image taken/store: yes  Outcomes: uncomplicated and patient tolerated procedure well

## 2022-11-26 LAB
ANION GAP SERPL CALCULATED.3IONS-SCNC: 11 MMOL/L (ref 7–16)
BASOPHILS ABSOLUTE: 0 E9/L (ref 0–0.2)
BASOPHILS RELATIVE PERCENT: 0 % (ref 0–2)
BUN BLDV-MCNC: 18 MG/DL (ref 6–23)
CALCIUM SERPL-MCNC: 9.6 MG/DL (ref 8.6–10.2)
CHLORIDE BLD-SCNC: 105 MMOL/L (ref 98–107)
CO2: 21 MMOL/L (ref 22–29)
CREAT SERPL-MCNC: 0.9 MG/DL (ref 0.5–1)
EOSINOPHILS ABSOLUTE: 0 E9/L (ref 0.05–0.5)
EOSINOPHILS RELATIVE PERCENT: 0 % (ref 0–6)
GFR SERPL CREATININE-BSD FRML MDRD: >60 ML/MIN/1.73
GLUCOSE BLD-MCNC: 123 MG/DL (ref 74–99)
HCT VFR BLD CALC: 38.5 % (ref 34–48)
HEMOGLOBIN: 12.2 G/DL (ref 11.5–15.5)
IMMATURE GRANULOCYTES #: 0.05 E9/L
IMMATURE GRANULOCYTES %: 0.3 % (ref 0–5)
LYMPHOCYTES ABSOLUTE: 1.23 E9/L (ref 1.5–4)
LYMPHOCYTES RELATIVE PERCENT: 8.6 % (ref 20–42)
MCH RBC QN AUTO: 29.8 PG (ref 26–35)
MCHC RBC AUTO-ENTMCNC: 31.7 % (ref 32–34.5)
MCV RBC AUTO: 93.9 FL (ref 80–99.9)
MONOCYTES ABSOLUTE: 1.04 E9/L (ref 0.1–0.95)
MONOCYTES RELATIVE PERCENT: 7.2 % (ref 2–12)
NEUTROPHILS ABSOLUTE: 12.06 E9/L (ref 1.8–7.3)
NEUTROPHILS RELATIVE PERCENT: 83.9 % (ref 43–80)
PDW BLD-RTO: 12.2 FL (ref 11.5–15)
PLATELET # BLD: 342 E9/L (ref 130–450)
PMV BLD AUTO: 9.1 FL (ref 7–12)
POTASSIUM REFLEX MAGNESIUM: 4.5 MMOL/L (ref 3.5–5)
RBC # BLD: 4.1 E12/L (ref 3.5–5.5)
SODIUM BLD-SCNC: 137 MMOL/L (ref 132–146)
WBC # BLD: 14.4 E9/L (ref 4.5–11.5)

## 2022-11-26 PROCEDURE — 6370000000 HC RX 637 (ALT 250 FOR IP)

## 2022-11-26 PROCEDURE — 80048 BASIC METABOLIC PNL TOTAL CA: CPT

## 2022-11-26 PROCEDURE — G0378 HOSPITAL OBSERVATION PER HR: HCPCS

## 2022-11-26 PROCEDURE — 6360000002 HC RX W HCPCS: Performed by: GENERAL ACUTE CARE HOSPITAL

## 2022-11-26 PROCEDURE — 85025 COMPLETE CBC W/AUTO DIFF WBC: CPT

## 2022-11-26 PROCEDURE — 6370000000 HC RX 637 (ALT 250 FOR IP): Performed by: GENERAL ACUTE CARE HOSPITAL

## 2022-11-26 PROCEDURE — 97161 PT EVAL LOW COMPLEX 20 MIN: CPT

## 2022-11-26 PROCEDURE — 2580000003 HC RX 258: Performed by: GENERAL ACUTE CARE HOSPITAL

## 2022-11-26 PROCEDURE — 36415 COLL VENOUS BLD VENIPUNCTURE: CPT

## 2022-11-26 RX ORDER — TRAMADOL HYDROCHLORIDE 50 MG/1
50 TABLET ORAL EVERY 4 HOURS PRN
Qty: 30 TABLET | Refills: 0 | Status: SHIPPED | OUTPATIENT
Start: 2022-11-26 | End: 2022-12-01

## 2022-11-26 RX ADMIN — Medication 500 MG: at 08:40

## 2022-11-26 RX ADMIN — Medication 2000 UNITS: at 08:41

## 2022-11-26 RX ADMIN — ASPIRIN 325 MG: 325 TABLET, COATED ORAL at 22:04

## 2022-11-26 RX ADMIN — OXYCODONE 5 MG: 5 TABLET ORAL at 22:04

## 2022-11-26 RX ADMIN — Medication 50 MCG: at 08:41

## 2022-11-26 RX ADMIN — SODIUM CHLORIDE, PRESERVATIVE FREE 10 ML: 5 INJECTION INTRAVENOUS at 22:04

## 2022-11-26 RX ADMIN — OXYCODONE 5 MG: 5 TABLET ORAL at 15:52

## 2022-11-26 RX ADMIN — ASPIRIN 325 MG: 325 TABLET, COATED ORAL at 08:41

## 2022-11-26 RX ADMIN — ACETAMINOPHEN 650 MG: 325 TABLET ORAL at 13:33

## 2022-11-26 RX ADMIN — ROSUVASTATIN CALCIUM 10 MG: 10 TABLET, FILM COATED ORAL at 08:41

## 2022-11-26 RX ADMIN — MULTIPLE VITAMINS W/ MINERALS TAB 1 TABLET: TAB at 08:41

## 2022-11-26 RX ADMIN — WATER 2000 MG: 1 INJECTION INTRAMUSCULAR; INTRAVENOUS; SUBCUTANEOUS at 06:29

## 2022-11-26 ASSESSMENT — PAIN - FUNCTIONAL ASSESSMENT
PAIN_FUNCTIONAL_ASSESSMENT: PREVENTS OR INTERFERES SOME ACTIVE ACTIVITIES AND ADLS
PAIN_FUNCTIONAL_ASSESSMENT: ACTIVITIES ARE NOT PREVENTED

## 2022-11-26 ASSESSMENT — PAIN SCALES - GENERAL
PAINLEVEL_OUTOF10: 3
PAINLEVEL_OUTOF10: 4
PAINLEVEL_OUTOF10: 5
PAINLEVEL_OUTOF10: 10
PAINLEVEL_OUTOF10: 2
PAINLEVEL_OUTOF10: 10
PAINLEVEL_OUTOF10: 5

## 2022-11-26 ASSESSMENT — PAIN DESCRIPTION - ORIENTATION
ORIENTATION: RIGHT

## 2022-11-26 ASSESSMENT — PAIN DESCRIPTION - LOCATION
LOCATION: KNEE
LOCATION: KNEE
LOCATION: GROIN

## 2022-11-26 ASSESSMENT — PAIN DESCRIPTION - ONSET
ONSET: SUDDEN
ONSET: ON-GOING
ONSET: GRADUAL

## 2022-11-26 ASSESSMENT — PAIN DESCRIPTION - DESCRIPTORS
DESCRIPTORS: ACHING;DISCOMFORT
DESCRIPTORS: ACHING;DISCOMFORT;SORE;TENDER
DESCRIPTORS: ACHING

## 2022-11-26 ASSESSMENT — PAIN DESCRIPTION - PAIN TYPE
TYPE: ACUTE PAIN;SURGICAL PAIN
TYPE: SURGICAL PAIN
TYPE: ACUTE PAIN

## 2022-11-26 ASSESSMENT — PAIN DESCRIPTION - FREQUENCY: FREQUENCY: CONTINUOUS

## 2022-11-26 NOTE — CONSULTS
[facility]  History and Physical      REASON FOR CONSULTATION:  Medical Management    ATTENDING/ADMITTING PHYSICIAN:Dr. Benjamin    HISTORY OF PRESENT ILLNESS:      The patient is a 69 y.o. female patient of Dr. Hayden Grande who presents with right patellar ORIF. Patient was seen in orthopedic office after sustaining a fall from standing height in which she fell directly onto her right knee. X-rays revealed a completely displaced transverse fracture at the mid pole of the patella. Surgery was recommended. Patient has a past medical history of arthritis, blood clots, hyperlipidemia, and KEYONA V. Patient lives alone therefore she will need placement for approximately a week or 2. Patient's vital signs are stable, WBC 14.4 which is likely reactive.   We will continue to follow    Past Medical History:    Past Medical History:   Diagnosis Date    Arthritis     Fracture of right patella     Hx of blood clots     Hyperlipidemia     PONV (postoperative nausea and vomiting)        Past Surgical History:    Past Surgical History:   Procedure Laterality Date    CATARACT EXTRACTION EXTRACAPSULAR W/ INTRAOCULAR LENS IMPLANTATION Bilateral     COLONOSCOPY      PATELLA FRACTURE SURGERY Right 11/25/2022    RIGHT PATELLA OPEN REDUCTION INTERNAL FIXATION  ++PNB++ performed by Verona Singh DO at [facility]         Medications Prior to Admission:    Medications Prior to Admission: rosuvastatin (CRESTOR) 10 MG tablet, Take 10 mg by mouth daily  sertraline (ZOLOFT) 25 MG tablet, Take 25 mg by mouth at bedtime  Multiple Vitamins-Minerals (THERAPEUTIC MULTIVITAMIN-MINERALS) tablet, Take 1 tablet by mouth daily  Cholecalciferol (VITAMIN D3) 50 MCG (2000 UT) CAPS, Take by mouth daily  COLLAGEN PO, Take by mouth daily  calcium carbonate (OSCAL) 500 MG TABS tablet, Take 500 mg by mouth daily  vitamin B-12 (CYANOCOBALAMIN) 100 MCG tablet, Take 50 mcg by mouth daily  [DISCONTINUED] aspirin 81 MG EC tablet, Take 81 mg by mouth daily    Allergies:    Patient has no known allergies. Social History:    reports that she has quit smoking. Her smoking use included cigarettes. She has never used smokeless tobacco. She reports that she does not drink alcohol and does not use drugs. Family History:   Unable to obtain at this time    REVIEW OF SYSTEMS:  As above in the HPI, otherwise negative    PHYSICAL EXAM:    Vitals:  BP (!) 118/58   Pulse 73   Temp 98 °F (36.7 °C) (Oral)   Resp 16   Ht 5' 6\" (1.676 m)   Wt 162 lb (73.5 kg)   SpO2 94%   BMI 26.15 kg/m²     General:  Awake, alert, oriented X 3. Well developed, well nourished, well groomed. No apparent distress. HEENT:  Normocephalic, atraumatic. Pupils equal, round, reactive to light. No scleral icterus. No conjunctival injection. Normal lips, teeth, and gums. No nasal discharge. Neck:  Supple  Heart:  RRR, no murmurs, gallops, rubs  Lungs:  CTA bilaterally, bilat symmetrical expansion, no wheeze, rales, or rhonchi  Abdomen:   Bowel sounds present, soft, nontender, no masses, no organomegaly, no peritoneal signs  Extremities:  No clubbing, cyanosis, or edema  Skin:  Warm and dry, no open lesions or rash  Neuro:  Cranial nerves 2-12 intact, no focal deficits      LABS:    CBC with Differential:    Lab Results   Component Value Date/Time    WBC 14.4 11/26/2022 03:40 AM    RBC 4.10 11/26/2022 03:40 AM    HGB 12.2 11/26/2022 03:40 AM    HCT 38.5 11/26/2022 03:40 AM     11/26/2022 03:40 AM    MCV 93.9 11/26/2022 03:40 AM    MCH 29.8 11/26/2022 03:40 AM    MCHC 31.7 11/26/2022 03:40 AM    RDW 12.2 11/26/2022 03:40 AM    LYMPHOPCT 8.6 11/26/2022 03:40 AM    MONOPCT 7.2 11/26/2022 03:40 AM    BASOPCT 0.0 11/26/2022 03:40 AM    MONOSABS 1.04 11/26/2022 03:40 AM    LYMPHSABS 1.23 11/26/2022 03:40 AM    EOSABS 0.00 11/26/2022 03:40 AM    BASOSABS 0.00 11/26/2022 03:40 AM     CMP:    Lab Results   Component Value Date/Time     11/26/2022 03:40 AM    K 4.5 11/26/2022 03:40 AM     11/26/2022 03:40 AM    CO2 21 11/26/2022 03:40 AM    BUN 18 11/26/2022 03:40 AM    CREATININE 0.9 11/26/2022 03:40 AM    LABGLOM >60 11/26/2022 03:40 AM    GLUCOSE 123 11/26/2022 03:40 AM    CALCIUM 9.6 11/26/2022 03:40 AM       ASSESSMENT:      Principal Problem:    Closed disp transverse fracture of right patella with routine healing  Resolved Problems:    * No resolved hospital problems.  *      PLAN:    73-year-old female experienced mechanical fall resulting in a fracture of her right patella    S/p-11/25-ORIF right patella  WBC-14.4-reactive we will continue to monitor  Vital signs stable  PT OT evaluate for subacute rehab  ASA per orthopedic surgery for DVT prophylaxis  Monitor closely as patient has a history of DVTs  Monitor H&H-12.2/38.5      Juan Carlos Solis MD  1:28 PM  11/26/2022

## 2022-11-26 NOTE — PLAN OF CARE
Problem: Discharge Planning  Goal: Discharge to home or other facility with appropriate resources  11/26/2022 1130 by Niranjan Morrow, RN  Outcome: Progressing     Problem: Pain  Goal: Verbalizes/displays adequate comfort level or baseline comfort level  11/26/2022 1130 by Niranjan Morrow RN  Outcome: Progressing     Problem: Safety - Adult  Goal: Free from fall injury  11/26/2022 1130 by Niranjan Morrow, RN  Outcome: Progressing

## 2022-11-26 NOTE — DISCHARGE INSTRUCTIONS
Maintain knee immobilizer  You may remove the knee immobilizer to shower, but do so while sitting on a chair or stool and keeping her leg straight  No tub soaks  The knee immobilizer must be in place whenever upright  No more than touchdown weightbearing with knee immobilizer in place  Avoid bending of the knee until follow-up  Take aspirin for DVT prophylaxis  Take pain medication as needed  Follow-up in office in 2 weeks

## 2022-11-26 NOTE — PROGRESS NOTES
Initial Evaluation      Attending Provider:  Isidro Shaw DO    Evaluating PT:  Foster Goodpasture PT, RRT, CEAS    Room #:  4027/7908-W  Diagnosis:  S/P Fall with closed displaced transverse fracture of right patella  Pertinent PMHx/PSHx:  See PMH  Procedure/Surgery:  ORIF RT Patellar 11/25/22  Precautions:  TDWB RTLE, Knee Immob AAT, No knee flexion,   Equipment Needs:  [employer]    SUBJECTIVE:    Pt lives with alone in a 1 story home with 4 stairs and 1 rail to enter. Pt ambulated with no AD PTA. Was highly indep prior to fall. OBJECTIVE:   Initial Evaluation  Date: 11/26/22 Treatment Short Term/ Long Term   Goals   Was pt agreeable to Eval/treatment? Yes     Does pt have pain? Min mid line patellar pain, otherwise no pain     Bed Mobility  Rolling: S/Indep  Supine to sit: S/Indep  Sit to supine: S/Indep  Scooting: indep  Indep all levels    Transfers Sit to stand: CGA/SBA  Stand to sit: SBA/CGA  Stand pivot: NA  SBA/CGA levels    Ambulation   20 feet with [employer] CGA, TDWB RTLE  40-50 feet with [employer] CGA TDWB RTLE   Stair negotiation: ascended and descended  NA      ROM Knee immob in line   No ROM RT Knee    MMT RT ankle 5/5     AM-PAC 6 Clicks 48/19       Pt is A & O x 3   Sensation:  Pt denies numbness and tingling to RTLE   Edema:  Post op RT  Balance: sitting:indep and standing: requiring [employer] and CGA/SBA  Endurance: ANITRA    Patient education  Pt educated on TDWB status during transfers and mobility, use of RT knee immob and no knee flexion,     Patient response to education:   Pt verbalized understanding Pt demonstrated skill Pt requires further education in this area   Y Y Review PRN      ASSESSMENT:    Comments: In bed on arrival in no distress. Just reporting mid anterior patellar discomfort. Understands TDWB status, use of knee Immmob AAT and no bending knee. Transfer to EOB was S to indep levels w/o any dizziness.   Sat on EOB few minutes to discuss restrictions and use of knee immob and fully aware and understands. Transfer to stand from bed was SBA/CGA while transferring unloaded RT foot/LE in a NWB/TDWB pattern. No dizziness again reported. No staggered or delayed transfer behaviors to stand. Amb with [employer], knee immob and TDWB status w/o any needed cues or education. No SOB/conversational dyspnea during-post 20' walk with [employer] use. Wanted to return to bed following walk. Transfer into supine position was indep/S. LTLE ICD placed. Call light and wall phone on bed and easily accessible. Tray to RT side and height of tray good. Very appreciative for care. Treatment:  Patient practiced and was instructed in the following treatment:    Eval    Pt's/ family goals   1. Rehab stay     Patient and or family understand(s) diagnosis, prognosis, and plan of care. PLAN:    PT care will be provided in accordance with the objectives noted above. Exercises and functional mobility practice will be used as well as appropriate assistive devices or modalities to obtain goals. Patient and family education will also be administered as needed. Frequency of treatments:  Daily    Total Treatment Time  0 minutes     Evaluation Time includes thorough review of current medical information, gathering information on past medical history/social history and prior level of function, completion of standardized testing/informal observation of tasks, assessment of data and education on plan of care and goals.     CPT codes:  [x] Low Complexity PT evaluation 51985  [] Moderate Complexity PT evaluation 39068  [] High Complexity PT evaluation 85819  [] PT Re-evaluation 13744  [] Gait training 80577 ** minutes  [] Manual therapy 69085 ** minutes  [] Therapeutic activities 74332 ** minutes  [] Therapeutic exercises 51373 ** minutes  [] Neuromuscular reeducation 90339 ** minutes    Ranulfo Hall PT, RRT, CEAS

## 2022-11-26 NOTE — PROGRESS NOTES
Department of Orthopedic Surgery  Trauma / Fracture Care   Attending Progress Note        Subjective:  No complaints patient doing very well. She denies pain at this time. No acute issues overnight. Upright and walking with wheeled walker with therapy, tolerating very well    Vitals  VITALS:  BP (!) 111/52   Pulse 65   Temp 98.2 °F (36.8 °C) (Oral)   Resp 16   Ht 5' 6\" (1.676 m)   Wt 162 lb (73.5 kg)   SpO2 93%   BMI 26.15 kg/m²     PHYSICAL EXAM:    Orientation:  alert and oriented to person, place and time    Right Lower Extremity    Compartments: soft      Incision:  dressing in place, clean, dry, and intact    Upper extremity Motor: 5/5 axillary, mc,m,r,u,ain,pin    Upper extremity sensory: grossly in tact    Lower Extremity Motor :   Quadriceps:  5/5  Extensor hallucis:  5/5  Dorsiflexion:  5/5  Plantarflexion:  5/5    Lower Extremity Sensory:  Tibial Nerve:  intact  Superficial Peroneal Nerve:  intact  Deep Peroneal Nerve:  intact    Pulses:    Posterior Tibial:  2  Dorsalis Pedis:  2  Posterior Tibial Artery:  2    Abnormal Exam findings:  none    Brace: Knee immobilizer intact    LABS:    HgB:    Lab Results   Component Value Date    HGB 10.0 8/28/2011     INR:    No components found with this basename: PTPATIENT, PTINR     CBC:   Lab Results   Component Value Date/Time    WBC 14.4 11/26/2022 03:40 AM    RBC 4.10 11/26/2022 03:40 AM    HGB 12.2 11/26/2022 03:40 AM    HCT 38.5 11/26/2022 03:40 AM    MCV 93.9 11/26/2022 03:40 AM    MCH 29.8 11/26/2022 03:40 AM    MCHC 31.7 11/26/2022 03:40 AM    RDW 12.2 11/26/2022 03:40 AM     11/26/2022 03:40 AM    MPV 9.1 11/26/2022 03:40 AM       ASSESSMENT AND PLAN:    Post operative day 1 status post right patella ORIF    1:  Touch down weight bearing  2:  Deep venous thrombosis prophylaxis -aspirin 325 twice daily  3:  Continue physical therapy  4:  D/C Plan:  Murali Smith  5:  F/U Plan              6: Patient is doing extremely well.   I anticipate that she will need a short stay at skilled nursing facility due to the fact that she is at home alone. Stable for discharge to skilled nursing facility once a bed has been approved. She will need follow-up in my office in 2 weeks. Discussed with nursing that they may remove the Ace wrap at her dressing, leaving the Aquacel in place. Patient was instructed that she may shower with Aquacel in place. Knee immobilizer is to remain intact essentially at all times, removing only for hygiene but being sure to keep the knee straight and maintain nonweightbearing when doing so.     Semaj Worley DO  11/26/2022  10:36 AM

## 2022-11-27 LAB
BASOPHILS ABSOLUTE: 0.03 E9/L (ref 0–0.2)
BASOPHILS RELATIVE PERCENT: 0.3 % (ref 0–2)
EOSINOPHILS ABSOLUTE: 0.14 E9/L (ref 0.05–0.5)
EOSINOPHILS RELATIVE PERCENT: 1.4 % (ref 0–6)
HCT VFR BLD CALC: 36.7 % (ref 34–48)
HEMOGLOBIN: 11.8 G/DL (ref 11.5–15.5)
IMMATURE GRANULOCYTES #: 0.03 E9/L
IMMATURE GRANULOCYTES %: 0.3 % (ref 0–5)
LYMPHOCYTES ABSOLUTE: 2.93 E9/L (ref 1.5–4)
LYMPHOCYTES RELATIVE PERCENT: 29.4 % (ref 20–42)
MCH RBC QN AUTO: 30.1 PG (ref 26–35)
MCHC RBC AUTO-ENTMCNC: 32.2 % (ref 32–34.5)
MCV RBC AUTO: 93.6 FL (ref 80–99.9)
MONOCYTES ABSOLUTE: 0.99 E9/L (ref 0.1–0.95)
MONOCYTES RELATIVE PERCENT: 9.9 % (ref 2–12)
NEUTROPHILS ABSOLUTE: 5.84 E9/L (ref 1.8–7.3)
NEUTROPHILS RELATIVE PERCENT: 58.7 % (ref 43–80)
PDW BLD-RTO: 12.5 FL (ref 11.5–15)
PLATELET # BLD: 296 E9/L (ref 130–450)
PMV BLD AUTO: 9.2 FL (ref 7–12)
RBC # BLD: 3.92 E12/L (ref 3.5–5.5)
WBC # BLD: 10 E9/L (ref 4.5–11.5)

## 2022-11-27 PROCEDURE — 6370000000 HC RX 637 (ALT 250 FOR IP)

## 2022-11-27 PROCEDURE — G0378 HOSPITAL OBSERVATION PER HR: HCPCS

## 2022-11-27 PROCEDURE — 85025 COMPLETE CBC W/AUTO DIFF WBC: CPT

## 2022-11-27 PROCEDURE — 36415 COLL VENOUS BLD VENIPUNCTURE: CPT

## 2022-11-27 PROCEDURE — 97116 GAIT TRAINING THERAPY: CPT

## 2022-11-27 PROCEDURE — 6370000000 HC RX 637 (ALT 250 FOR IP): Performed by: GENERAL ACUTE CARE HOSPITAL

## 2022-11-27 PROCEDURE — 2580000003 HC RX 258

## 2022-11-27 PROCEDURE — 97530 THERAPEUTIC ACTIVITIES: CPT

## 2022-11-27 RX ADMIN — Medication 2000 UNITS: at 08:39

## 2022-11-27 RX ADMIN — ROSUVASTATIN CALCIUM 10 MG: 10 TABLET, FILM COATED ORAL at 08:39

## 2022-11-27 RX ADMIN — OXYCODONE 5 MG: 5 TABLET ORAL at 03:53

## 2022-11-27 RX ADMIN — Medication 50 MCG: at 08:42

## 2022-11-27 RX ADMIN — ASPIRIN 325 MG: 325 TABLET, COATED ORAL at 20:26

## 2022-11-27 RX ADMIN — MULTIPLE VITAMINS W/ MINERALS TAB 1 TABLET: TAB at 08:42

## 2022-11-27 RX ADMIN — ASPIRIN 325 MG: 325 TABLET, COATED ORAL at 08:39

## 2022-11-27 RX ADMIN — Medication 10 ML: at 08:41

## 2022-11-27 RX ADMIN — Medication 10 ML: at 20:26

## 2022-11-27 RX ADMIN — Medication 500 MG: at 08:39

## 2022-11-27 ASSESSMENT — PAIN SCALES - GENERAL
PAINLEVEL_OUTOF10: 0
PAINLEVEL_OUTOF10: 4

## 2022-11-27 ASSESSMENT — PAIN DESCRIPTION - PAIN TYPE: TYPE: ACUTE PAIN;SURGICAL PAIN

## 2022-11-27 ASSESSMENT — PAIN DESCRIPTION - ONSET
ONSET: ON-GOING
ONSET: ON-GOING

## 2022-11-27 ASSESSMENT — PAIN DESCRIPTION - DESCRIPTORS: DESCRIPTORS: ACHING;DISCOMFORT;SORE;TENDER

## 2022-11-27 ASSESSMENT — PAIN DESCRIPTION - ORIENTATION: ORIENTATION: RIGHT

## 2022-11-27 ASSESSMENT — PAIN DESCRIPTION - FREQUENCY
FREQUENCY: CONTINUOUS
FREQUENCY: INTERMITTENT

## 2022-11-27 ASSESSMENT — PAIN - FUNCTIONAL ASSESSMENT
PAIN_FUNCTIONAL_ASSESSMENT: ACTIVITIES ARE NOT PREVENTED
PAIN_FUNCTIONAL_ASSESSMENT: ACTIVITIES ARE NOT PREVENTED

## 2022-11-27 ASSESSMENT — PAIN DESCRIPTION - LOCATION: LOCATION: KNEE

## 2022-11-27 NOTE — PROGRESS NOTES
Daily Treat      Evaluating PT:  Sin Lang PT, RRT, CEAS     Room #:  0943/8463-J  Diagnosis:  S/P Fall with closed displaced transverse fracture of right patella  Pertinent PMHx/PSHx:  See PMH  Procedure/Surgery:  ORIF RT Patellar 11/25/22  Precautions:  TDWB RTLE, Knee Immob AAT, No knee flexion,   Equipment Needs:  [employer]     SUBJECTIVE:     Pt lives with alone in a 1 story home with 4 stairs and 1 rail to enter. Pt ambulated with no AD PTA. Was highly indep prior to fall. OBJECTIVE:    Initial Evaluation  Date: 11/26/22 Treatment  11/27/22 Short Term/ Long Term   Goals   Was pt agreeable to Eval/treatment? Yes Yes      Does pt have pain? Min mid line patellar pain, otherwise no pain Same mid anterior patellar intermittent      Bed Mobility  Rolling: S/Indep  Supine to sit: S/Indep  Sit to supine: S/Indep  Scooting: indep  Indep all levels with knee immob in line/secured Indep all levels    Transfers Sit to stand: CGA/SBA  Stand to sit: SBA/CGA  Stand pivot: NA  Bed-stand: SBA/S   Stand-bed: Indep/S SBA/CGA levels    Ambulation   20 feet with [employer] CGA, TDWB RTLE  50' x 2 with [employer] TDWB RTLE @ S/Indep 40-50 feet with [employer] CGA TDWB RTLE   Stair negotiation: ascended and descended  NA  NA     ROM Knee immob in line   Knee immob in line No ROM RT Knee    MMT RT ankle 5/5  RT hip/ankle WFL, knee NA     AM-PAC 6 Clicks 47/85  93/00          Pt is alert and oriented x3  Balance: Indep with [employer] and requires [employer] 2/2 TDWB status    Patient education  Pt educated on TDWB and remembered w/o any hesitations, Use of knee immob and again remembered w/o any hesitations    Patient response to education:   Pt verbalized understanding Pt demonstrated skill Pt requires further education in this area   Y Y N     ASSESSMENT:    Comments: In bed on arrival w/o any reported issues with pain; took pain med and states will never take again as has been groggy and sleeps since then and the pain not bad enough.   Transfer to EOB was indep and w/o any delays or difficulties; uses her UE's-hands to grab and place RTLE with immob in line to EOB and to rest on floor. Transfer to stand was with RTLE in immob and fully extended as brace limits. No staggered or delayed mvmt patterns to transition into standing position from sitting EOB. No inc pain expressed. No dizziness expressed during-post all positional changes. Statically and dynamically mobilized with RTLE resting/sliding-advancing foot-leg during gait cycle. No visual loading RTLE while walking maintaining the TDWB. Was able to walk w/o any inc pain 50' x 2. No stop standing rest breaks required. No UE/hand fatigue expressed for maintaining TDWB with WW.  Steps NA as loading status restrictions and going to rehab on D/C; will allow time and loading advancing to have rehab PT's assess stair abilities. Return to supine position @ walk as stated does not want to sit in a chair and is tired/wants to return to bed. Indep transfer to supine position. ICD placed distal LTLE. Call light and wall phone on bed. Cell phone in hand. Tray to RT side and tray height adjusted. Very appreciative for care. Treatment:  Patient practiced and was instructed in the following treatment:    Bed mob  Transfers  Amb    PLAN:    Pt is making ++ progress toward established Physical Therapy goals. Continue with physical therapy current plan of care. Total Treatment Time  30 minutes     Evaluation Time includes thorough review of current medical information, gathering information on past medical history/social history and prior level of function, completion of standardized testing/informal observation of tasks, assessment of data and education on plan of care and goals.     CPT codes:  [] Low Complexity PT evaluation 83754  [] Moderate Complexity PT evaluation 43015  [] High Complexity PT evaluation 09842  [] PT Re-evaluation 09968  [x] Gait training 75854 ** minutes  [] Manual therapy 39441 ** minutes  [x] Therapeutic activities 29294 ** minutes  [] Therapeutic exercises 84412 ** minutes  [] Neuromuscular reeducation 50172 ** minutes       Elodia Bachelor PT, RRT, CEAS

## 2022-11-28 LAB
BASOPHILS ABSOLUTE: 0.04 E9/L (ref 0–0.2)
BASOPHILS RELATIVE PERCENT: 0.4 % (ref 0–2)
EOSINOPHILS ABSOLUTE: 0.25 E9/L (ref 0.05–0.5)
EOSINOPHILS RELATIVE PERCENT: 2.8 % (ref 0–6)
HCT VFR BLD CALC: 38.3 % (ref 34–48)
HEMOGLOBIN: 12.2 G/DL (ref 11.5–15.5)
IMMATURE GRANULOCYTES #: 0.03 E9/L
IMMATURE GRANULOCYTES %: 0.3 % (ref 0–5)
LYMPHOCYTES ABSOLUTE: 2.73 E9/L (ref 1.5–4)
LYMPHOCYTES RELATIVE PERCENT: 30.2 % (ref 20–42)
MCH RBC QN AUTO: 30 PG (ref 26–35)
MCHC RBC AUTO-ENTMCNC: 31.9 % (ref 32–34.5)
MCV RBC AUTO: 94.3 FL (ref 80–99.9)
MONOCYTES ABSOLUTE: 0.87 E9/L (ref 0.1–0.95)
MONOCYTES RELATIVE PERCENT: 9.6 % (ref 2–12)
NEUTROPHILS ABSOLUTE: 5.12 E9/L (ref 1.8–7.3)
NEUTROPHILS RELATIVE PERCENT: 56.7 % (ref 43–80)
PDW BLD-RTO: 12.3 FL (ref 11.5–15)
PLATELET # BLD: 319 E9/L (ref 130–450)
PMV BLD AUTO: 9.1 FL (ref 7–12)
RBC # BLD: 4.06 E12/L (ref 3.5–5.5)
WBC # BLD: 9 E9/L (ref 4.5–11.5)

## 2022-11-28 PROCEDURE — G0378 HOSPITAL OBSERVATION PER HR: HCPCS

## 2022-11-28 PROCEDURE — 6370000000 HC RX 637 (ALT 250 FOR IP): Performed by: GENERAL ACUTE CARE HOSPITAL

## 2022-11-28 PROCEDURE — 2580000003 HC RX 258: Performed by: GENERAL ACUTE CARE HOSPITAL

## 2022-11-28 PROCEDURE — 85025 COMPLETE CBC W/AUTO DIFF WBC: CPT

## 2022-11-28 PROCEDURE — 97535 SELF CARE MNGMENT TRAINING: CPT

## 2022-11-28 PROCEDURE — 6370000000 HC RX 637 (ALT 250 FOR IP)

## 2022-11-28 PROCEDURE — 97530 THERAPEUTIC ACTIVITIES: CPT

## 2022-11-28 PROCEDURE — 36415 COLL VENOUS BLD VENIPUNCTURE: CPT

## 2022-11-28 RX ADMIN — SODIUM CHLORIDE, PRESERVATIVE FREE 10 ML: 5 INJECTION INTRAVENOUS at 08:27

## 2022-11-28 RX ADMIN — ROSUVASTATIN CALCIUM 10 MG: 10 TABLET, FILM COATED ORAL at 08:26

## 2022-11-28 RX ADMIN — ASPIRIN 325 MG: 325 TABLET, COATED ORAL at 08:26

## 2022-11-28 RX ADMIN — MULTIPLE VITAMINS W/ MINERALS TAB 1 TABLET: TAB at 08:27

## 2022-11-28 RX ADMIN — Medication 50 MCG: at 08:26

## 2022-11-28 RX ADMIN — Medication 500 MG: at 08:27

## 2022-11-28 RX ADMIN — POLYETHYLENE GLYCOL 3350 17 G: 17 POWDER, FOR SOLUTION ORAL at 08:26

## 2022-11-28 RX ADMIN — ASPIRIN 325 MG: 325 TABLET, COATED ORAL at 22:14

## 2022-11-28 RX ADMIN — Medication 2000 UNITS: at 08:27

## 2022-11-28 RX ADMIN — ACETAMINOPHEN 650 MG: 325 TABLET ORAL at 22:14

## 2022-11-28 ASSESSMENT — PAIN DESCRIPTION - ORIENTATION: ORIENTATION: RIGHT

## 2022-11-28 ASSESSMENT — PAIN SCALES - GENERAL
PAINLEVEL_OUTOF10: 0
PAINLEVEL_OUTOF10: 0
PAINLEVEL_OUTOF10: 1

## 2022-11-28 ASSESSMENT — PAIN - FUNCTIONAL ASSESSMENT: PAIN_FUNCTIONAL_ASSESSMENT: PREVENTS OR INTERFERES SOME ACTIVE ACTIVITIES AND ADLS

## 2022-11-28 ASSESSMENT — PAIN DESCRIPTION - DESCRIPTORS: DESCRIPTORS: ACHING;DISCOMFORT;TENDER

## 2022-11-28 ASSESSMENT — PAIN DESCRIPTION - LOCATION: LOCATION: LEG

## 2022-11-28 NOTE — PROGRESS NOTES
Occupational Therapy  OT BEDSIDE TREATMENT NOTE      Date:2022  Patient Name: Shar Craig  MRN: [id number]  : 1945  Room: [address] [place], [place] [address]     Evaluating OT: Angelo Alonzo OTR/L   [id number]       Referring Provider:Raad Santos DO    Specific Provider Orders/Date:OT eval and treat 2022       Diagnosis:  Closed displaced transverse fracture of right patella, initial encounter [S82.031A]  Closed disp transverse fracture of right patella with routine healing [S82.031D]  sustained a fall from standing height in which she fell directly onto a flexed right knee.   X-rays revealed a completely displaced transverse fracture at the mid pole of the patella  2022  family has been staying with her to assist since then - they live out of town and have returned home    Procedure(s):  [facility] 2022    Precautions:  Fall Risk, TTWB R LE, knee immobilizer       Assessment of current deficits    [x] Functional mobility            [x]ADLs           [x] Strength                  []Cognition    [x] Functional transfers          [x] IADLs         [x] Safety Awareness   [x]Endurance    [] Fine Coordination                         [x] Balance      [] Vision/perception   []Sensation      []Gross Motor Coordination             [] ROM           [] Delirium                   [] Motor Control      OT PLAN OF CARE   OT POC based on physician orders, patient diagnosis and results of clinical assessment     Frequency/Duration  2-4 days/wk for 2 weeks PRN   Specific OT Treatment Interventions to include:   ADL retraining/adapted techniques and AE recommendations to increase functional independence within precautions                    Energy conservation techniques to improve tolerance for selfcare routine   Functional transfer/mobility training/DME recommendations for increased independence, safety and fall prevention         Patient/family education to increase safety and functional independence             Environmental modifications for safe mobility and completion of ADLs                             Therapeutic activity to improve functional performance during ADLs. Therapeutic exercise to improve tolerance and functional strength for ADLs    Balance retraining/tolerance tasks for facilitation of postural control with dynamic challenges during ADLs . Positioning to improve functional independence  []      Recommended Adaptive Equipment: continue to assess      Home Living: Pt lives alone, 1 story with 4 steps/rail    Bathroom setup: walk in shower, grab bars, shower seat    Equipment owned: wheeled walker      Prior Level of Function: assist with ADLs , assist  with IADLs; ambulated with walker      Pain Level: no pain reported. Cognition: Awake and alert. Cues for safety. Functional Assessment:  AM-PAC Daily Activity Raw Score: 16/24    Initial Eval Status  Date: 11/25/22 Treatment Status  Date:11/28/22  STGs = LTGs  Time frame: 10-14 days   Feeding Independent        Grooming Set-up ,seated  SBA for balance while standing at a sink. Independent    UB Dressing Set-up    Independent    LB Dressing Mod A  Pt able to long sit in the bed to reach toes. Assist to thread clothing over immobilizer. Mod I    Bathing Min A    Mod I    Toileting SBA  Seated on commode  SBA for safety during transfer. Pt able to complete toilet hygiene. Mod I    Bed Mobility  SBA  Supine <> sit  SBA supine <> sit   Mod I    Functional Transfers CGA  Sit - stand from bed, BSC  SBA and cues for safety technique. Mod I    Functional Mobility CGA,w/walker   Knee immobilizer on   SPT to<> from bed/BSC  Able to side step to [facility] - patient educated on TTWB - leary of putting to much weight down so patient stayed NWB and hopped the short distance   Continue to reinforce TTWB precaution  CGA using w/w.   Pt appears to be placing more weight onto LE than TTWB. Pt very insistent that she is maintaining WB restrictions. Mod I  with good tolerance    Balance Sitting:     Static:  Independent     Dynamic:SBA   Standing: CGA    Independent    Activity Tolerance No SOB  Fair   Good  with ADL activity        Comments:  Pt agreeable to therapy this PM.  Completed toilet transfer and mobility to and from bathroom. Cues for maintaining TTWB. Pt very insistent that she is not putting weight onto her LE. She returned to bed at the end of the session. Education/treatment:  ADL retraining with facilitation of movement to increase self care skills. Therapeutic activity to address balance and endurance for ADL and transfers. Pt education of walker safety and transfer safety. Pt has made  progress towards set goals.        Time In: 2:00   Time Out: 2:14     Min Units   Therapeutic Ex 41555     Therapeutic Activities 86597     ADL/Self Care 35983 14 1   Orthotic Management 41371     Neuro Re-Ed 76056     Non-Billable Time     TOTAL TIMED TREATMENT           Vanesa Sahni JUDITH/L [id number]

## 2022-11-28 NOTE — PROGRESS NOTES
Daily Treat      Evaluating PT:  Jim Villaseñor PT, RRT, CEAS     Room #:  3361/8105-L  Diagnosis:  S/P Fall with closed displaced transverse fracture of right patella  Pertinent PMHx/PSHx:  See PMH  Procedure/Surgery:  ORIF RT Patellar 11/25/22  Precautions:  TDWB RTLE, Knee Immob AAT, No knee flexion,   Equipment Needs:  [employer]     SUBJECTIVE:     Pt lives with alone in a 1 story home with 4 stairs and 1 rail to enter. Pt ambulated with no AD PTA. Was highly indep prior to fall. OBJECTIVE:    Initial Evaluation  Date: 11/26/22 Treatment  11/28/22 Short Term/ Long Term   Goals   Was pt agreeable to Eval/treatment? Yes Yes      Does pt have pain? Min mid line patellar pain, otherwise no pain Same mid anterior patellar intermittent      Bed Mobility  Rolling: S/Indep  Supine to sit: S/Indep  Sit to supine: S/Indep  Scooting: indep Supine to sit: SBA with self assist  Sit to supine: Min A R LE support  Scooting: SBA seated to EOB Indep all levels    Transfers Sit to stand: CGA/SBA  Stand to sit: SBA/CGA  Stand pivot: NA Sit to stand; SBA  Stand to sit: SBA SBA/CGA levels    Ambulation   20 feet with WW CGA, TDWB RTLE 15 + 70 feet x 1 using WW for support SBA for balance. See comments 40-50 feet with [employer] CGA TDWB RTLE   Stair negotiation: ascended and descended  NA  NA     ROM Knee immob in line   Knee immob in line No ROM RT Knee    MMT RT ankle 5/5  RT hip/ankle WFL, knee NA     AM-PAC 6 Clicks 48/43  47/49          Pt is alert and able to follow instruction  Balance: fair dynamic using [employer] for support    Pt performed therapeutic exercise of the following: NT    Patient education/treatment  Pt was educated on maintaining TDWB R LE throughout gait    Patient response to education:   Pt verbalized understanding Pt demonstrated skill Pt requires further education in this area   yes With repeated instruction yes     ASSESSMENT:   Comments: Pt found in bed, sat EOB SBA. Gait slow and consistent, step to pattern used.  Pt at times appeared with increased R LE WB, with consistent instruction to maintain TDWB. Knee immobilizer on all Rx. Pt was left in bed per request with call light in reach    Time in 0805   Time out 0820     Total Treatment Time 15 minutes   CPT codes:     Therapeutic activities 86913 15 minutes   Therapeutic exercises 89708 0 minutes       Pt is making fair progress toward established Physical Therapy goals. Continue with physical therapy current plan of care.     Yazmin Cloud PTA   License Number: [id number]

## 2022-11-28 NOTE — PROGRESS NOTES
Spoke with nursing via telephone; patient continues to do very well with no pain, complaints or issues. Therapy notes reviewed.   She remains inpatient as we await approval of pre-certification of SNF placement, hopefully tomorrow

## 2022-11-28 NOTE — PROGRESS NOTES
[facility]   Progress note      Subjective: The patient is awake and alert. No acute events overnight. Denies chest pain, angina, SOB   No acute complaints except for pain to be expected postop    Objective:    /63   Pulse 78   Temp 98 °F (36.7 °C) (Oral)   Resp 16   Ht 5' 6\" (1.676 m)   Wt 162 lb (73.5 kg)   SpO2 95%   BMI 26.15 kg/m²     In: 200 [P.O.:200]  Out: -   In: 200   Out: -     General appearance: NAD, conversant  HEENT: AT/NC, MMM  Neck: FROM, supple  Lungs: Clear to auscultation  CV: RRR, no MRGs  Vasc: Radial pulses 2+  Abdomen: Soft, non-tender; no masses or HSM  Extremities: Status post right TKA  Skin: no rash, lesions or ulcers  Psych: Alert and oriented to person, place and time  Neuro: Alert and interactive     Recent Labs     11/26/22  0340 11/27/22  0332 11/28/22  0510   WBC 14.4* 10.0 9.0   HGB 12.2 11.8 12.2   HCT 38.5 36.7 38.3    296 319       Recent Labs     11/26/22  0340      K 4.5      CO2 21*   BUN 18   CREATININE 0.9   CALCIUM 9.6       Assessment:    Principal Problem:    Closed disp transverse fracture of right patella with routine healing  Resolved Problems:    * No resolved hospital problems.  *      Plan:    69-year-old female experienced mechanical fall resulting in a fracture of her right patella     S/p-11/25-ORIF right patella  WBC-14.4-now resolved and down to 9.0  Vital signs stable  PT OT evaluate for subacute rehab  ASA per orthopedic surgery for DVT prophylaxis  Monitor closely as patient has a history of DVTs  Monitor H&H- remained completely stable since admission, 12/38 today  Patient is okay for discharge from medicine standpoint once rehab arrangements are made       Code Status: Full  Consultants: Orthopedics is admitting service  DVT Prophylaxis   PT/OT  Discharge planning           Teja Mcdonald MD  6:32 PM  11/28/2022

## 2022-11-28 NOTE — CARE COORDINATION
11/28/2022  Social Work Discharge [address] is 17/24 and Pt is from home alone. Family all live out of town. Pt wants to go to a  [facility] . Sw gave a [facility] choice list. Waiting for choices. Electronically signed by ROBINA Singleton on 11/28/2022 at 10:10 AM    11/28/2022 Social Work Discharge Planning:Win made a a referral to [facility] per Ps request. Waiting reply. Electronically signed by ROBINA Singleton on 11/28/2022 at 12:05 PM      11/28/2022  The Plan for Transition of Care is related to the following treatment goals: kamini    The Patient   was provided with a choice of provider and agrees   with the discharge plan. [x] Yes [] No    Freedom of choice list was provided with basic dialogue that supports the patient's individualized plan of care/goals, treatment preferences and shares the quality data associated with the providers.  [x] Yes [] No

## 2022-11-28 NOTE — PLAN OF CARE
Problem: Discharge Planning  Goal: Discharge to home or other facility with appropriate resources  11/27/2022 2337 by Lydia Kinney RN  Outcome: Progressing  11/27/2022 1155 by Javier Daily RN  Outcome: Progressing     Problem: Pain  Goal: Verbalizes/displays adequate comfort level or baseline comfort level  11/27/2022 2337 by Lydia Kinney RN  Outcome: Progressing  Flowsheets (Taken 11/27/2022 2000)  Verbalizes/displays adequate comfort level or baseline comfort level:   Encourage patient to monitor pain and request assistance   Assess pain using appropriate pain scale   Administer analgesics based on type and severity of pain and evaluate response  11/27/2022 1155 by Javier Daily RN  Outcome: Progressing     Problem: Safety - Adult  Goal: Free from fall injury  11/27/2022 2337 by Lydia Kinney RN  Outcome: Progressing  11/27/2022 1155 by Javier Daily RN  Outcome: Progressing     Problem: ABCDS Injury Assessment  Goal: Absence of physical injury  11/27/2022 2337 by Lydia Kinney RN  Outcome: Progressing  11/27/2022 1155 by Javier Daily RN  Outcome: Progressing

## 2022-11-28 NOTE — PLAN OF CARE
Problem: Discharge Planning  Goal: Discharge to home or other facility with appropriate resources  11/28/2022 1236 by Chucho Merino RN  Outcome: Progressing  11/27/2022 2337 by Claudell Bulla, RN  Outcome: Progressing     Problem: Pain  Goal: Verbalizes/displays adequate comfort level or baseline comfort level  11/28/2022 1236 by Chucho Merino RN  Outcome: Progressing  11/27/2022 2337 by Claudell Bulla, RN  Outcome: Progressing  Flowsheets (Taken 11/27/2022 2000)  Verbalizes/displays adequate comfort level or baseline comfort level:   Encourage patient to monitor pain and request assistance   Assess pain using appropriate pain scale   Administer analgesics based on type and severity of pain and evaluate response     Problem: Safety - Adult  Goal: Free from fall injury  11/28/2022 1236 by Chucho Merino RN  Outcome: Progressing  11/27/2022 2337 by Claudell Bulla, RN  Outcome: Progressing     Problem: ABCDS Injury Assessment  Goal: Absence of physical injury  11/28/2022 1236 by Chucho Merino RN  Outcome: Progressing  11/27/2022 2337 by Claudell Bulla, RN  Outcome: Progressing

## 2022-11-29 PROCEDURE — G0378 HOSPITAL OBSERVATION PER HR: HCPCS

## 2022-11-29 PROCEDURE — 6370000000 HC RX 637 (ALT 250 FOR IP)

## 2022-11-29 PROCEDURE — 97530 THERAPEUTIC ACTIVITIES: CPT

## 2022-11-29 PROCEDURE — 1200000000 HC SEMI PRIVATE

## 2022-11-29 PROCEDURE — 6370000000 HC RX 637 (ALT 250 FOR IP): Performed by: GENERAL ACUTE CARE HOSPITAL

## 2022-11-29 RX ADMIN — ASPIRIN 325 MG: 325 TABLET, COATED ORAL at 07:50

## 2022-11-29 RX ADMIN — ACETAMINOPHEN 650 MG: 325 TABLET ORAL at 20:24

## 2022-11-29 RX ADMIN — ASPIRIN 325 MG: 325 TABLET, COATED ORAL at 20:24

## 2022-11-29 NOTE — PROGRESS NOTES
Daily Treat      Evaluating PT:  Willam Nolasco PT, RRT, CEAS     Room #:  9849/7842-G  Diagnosis:  S/P Fall with closed displaced transverse fracture of right patella  Pertinent PMHx/PSHx:  See PMH  Procedure/Surgery:  ORIF RT Patellar 11/25/22  Precautions:  TDWB RTLE, Knee Immob AAT, No knee flexion,   Equipment Needs:  [employer]     SUBJECTIVE:     Pt lives with alone in a 1 story home with 4 stairs and 1 rail to enter. Pt ambulated with no AD PTA. Was highly indep prior to fall. OBJECTIVE:    Initial Evaluation  Date: 11/26/22 Treatment  11/29/22 Short Term/ Long Term   Goals   Was pt agreeable to Eval/treatment? Yes Yes      Does pt have pain? Min mid line patellar pain, otherwise no pain No complaints     Bed Mobility  Rolling: S/Indep  Supine to sit: S/Indep  Sit to supine: S/Indep  Scooting: indep Supine to sit: SBA with self assist  Sit to supine: SBA R LE self assist  Scooting: SBA seated to EOB Indep all levels    Transfers Sit to stand: CGA/SBA  Stand to sit: SBA/CGA  Stand pivot: NA Sit to stand; SBA  Stand to sit: SBA SBA/CGA levels    Ambulation   20 feet with WW CGA, TDWB RTLE 60 feet x 2 using WW for support SBA for balance. See comments 40-50 feet with [employer] CGA TDWB RTLE   Stair negotiation: ascended and descended  NA  NA     ROM Knee immob in line   Knee immob in line No ROM RT Knee    MMT RT ankle 5/5  RT hip/ankle WFL, knee NA     AM-PAC 6 Clicks 75/81  05/07          Pt is alert and able to follow instruction  Balance: fair dynamic using [employer] for support    Pt performed therapeutic exercise of the following: NT    Patient education/treatment  Pt was educated on maintaining TDWB R LE throughout gait and promoting step to pattern    Patient response to education:   Pt verbalized understanding Pt demonstrated skill Pt requires further education in this area   yes With repeated instruction yes     ASSESSMENT:   Comments: Pt found in bed, sat EOB SBA. Gait slow and consistent, step to pattern used. Pt at times appeared with increased R LE WB, with consistent instruction to maintain TDWB. Knee immobilizer on all Rx, this adjusted after rx due to distal sliding    Pt was left in bed per request with call light in reach    Time in 0845  Time out 0901     Total Treatment Time 16 minutes   CPT codes:     Therapeutic activities 42061 16 minutes   Therapeutic exercises 79480 0 minutes       Pt is making fair progress toward established Physical Therapy goals. Continue with physical therapy current plan of care.     Jair Shay PTA   License Number: [id number]

## 2022-11-29 NOTE — PLAN OF CARE
Problem: Discharge Planning  Goal: Discharge to home or other facility with appropriate resources  11/29/2022 0143 by Vidhya Ignacio RN  Outcome: Progressing  11/28/2022 1236 by Tom Drake RN  Outcome: Progressing     Problem: Pain  Goal: Verbalizes/displays adequate comfort level or baseline comfort level  11/29/2022 0143 by Vidhya Ignacio RN  Outcome: Progressing  11/28/2022 1236 by Tom Drake RN  Outcome: Progressing     Problem: Safety - Adult  Goal: Free from fall injury  11/29/2022 0143 by Vidhya Ignacio RN  Outcome: Progressing  11/28/2022 1236 by Tom Drake RN  Outcome: Progressing     Problem: ABCDS Injury Assessment  Goal: Absence of physical injury  11/29/2022 0143 by Vidhya Ignacio RN  Outcome: Progressing  11/28/2022 1236 by Tom Drake RN  Outcome: Progressing

## 2022-11-29 NOTE — CARE COORDINATION
11/29/2022  Social Work Discharge Planning:OBS STATUS. SW was informed that all of [facility]'s are out of network with Pts insurance. SW made a referral to [address] [place] [address] and [facility] in [place]. Waiting reply. Electronically signed by ROBINA Aguilar on 11/29/2022 at 10:54 AM    11/29/2022  The Plan for Transition of Care is related to the following treatment goals: kamini    The Patient \as provided with a choice of provider and agrees   with the discharge plan. [x] Yes [] No    Freedom of choice list was provided with basic dialogue that supports the patient's individualized plan of care/goals, treatment preferences and shares the quality data associated with the providers. [x] Yes [] No    11/29/2022  Social Work Discharge Planning:[address] accepts Pt and will start precert today. KAYA, PASRR and transport form are completed. COVID test is needed. Electronically signed by ROBINA Aguilar on 11/29/2022 at 1:04 PM

## 2022-11-29 NOTE — DISCHARGE INSTR - COC
Continuity of Care Form    Patient Name: Gretta Pollack   :  1945  MRN:  [id number]    Admit date:  2022  Discharge date:  ***    Code Status Order: Full Code   Advance Directives:   [facility] Documentation       Date/Time Healthcare Directive Type of Healthcare Directive Copy in [address] Agent's Name Healthcare Agent's Phone Number    22 0725 No, patient does not have an advance directive for healthcare treatment -- -- -- -- --            Admitting Physician:  Ambika Ferraro DO  PCP: Leopoldo Paganini, DO    Discharging Nurse: [facility] Unit/Room#: 0250/2006-O  Discharging Unit Phone Number: ***    Emergency Contact:   Extended Emergency Contact Information  Primary Emergency Contact: Sergio Nelson  Address: [address] [place] [address] Phone: [phone]  Mobile Phone: [phone]  Relation: Child  Secondary Emergency Contact: Brittney Gavins [facility] Phone: [phone]  Relation: Child    Past Surgical History:  Past Surgical History:   Procedure Laterality Date    CATARACT EXTRACTION EXTRACAPSULAR W/ INTRAOCULAR LENS IMPLANTATION Bilateral     COLONOSCOPY      PATELLA FRACTURE SURGERY Right 2022    RIGHT PATELLA OPEN REDUCTION INTERNAL FIXATION  ++PNB++ performed by Ambika Ferraro DO at [address] [facility]         Immunization History:   Immunization History   Administered Date(s) Administered    COVID-19, J&J, (age 18y+), IM, 0.5 mL 2021, 2021       Active Problems:  Patient Active Problem List   Diagnosis Code    Closed disp transverse fracture of right patella with routine healing S82.031D       Isolation/Infection:   Isolation            No Isolation          Patient Infection Status       None to display            Nurse Assessment:  Last Vital Signs: BP (!) 143/67   Pulse 68   Temp 98.4 °F (36.9 °C) (Oral)   Resp 16   Ht 5' 6\" (1.676 m)   Wt 162 lb (73.5 kg)   SpO2 97%   BMI 26.15 kg/m²     Last documented pain score (0-10 scale): Pain Level: 1  Last Weight:   Wt Readings from Last 1 Encounters:   11/25/22 162 lb (73.5 kg)     Mental Status:  {IP PT MENTAL STATUS:20030}    IV Access:  { KAYA IV ACCESS:307445183}    Nursing Mobility/ADLs:  Walking   {P DME OGIC:112452222}  Transfer  {P DME YBNY:195719009}  Bathing  {P DME PGFE:106640025}  Dressing  {CHP DME QVQW:523035898}  Toileting  {P DME VKFV:482040473}  Feeding  {P DME ARXD:158094963}  Med Admin  {P DME PNBA:652524493}  Med Delivery   { KAYA MED Delivery:540027714}    Wound Care Documentation and Therapy:  Incision 11/25/22 Knee Anterior;Right (Active)   Dressing Status Clean;Dry; Intact; Old drainage noted 11/28/22 2100   Incision Cleansed Not Cleansed 11/28/22 0815   Dressing/Treatment Dry dressing 11/28/22 2100   Closure Other (Comment) 11/28/22 0815   Margins Other (Comment) 11/28/22 0815   Incision Assessment Other (Comment) 11/28/22 0815   Drainage Amount None 11/28/22 2100   Drainage Description Serosanguinous 11/28/22 0815   Odor None 11/28/22 2100   Number of days: 4        Elimination:  Continence: Bowel: {YES / JY:20152}  Bladder: {YES / RB:47466}  Urinary Catheter: {Urinary Catheter:747781215}   Colostomy/Ileostomy/Ileal Conduit: {YES / OU:23591}       Date of Last BM: ***  No intake or output data in the 24 hours ending 11/29/22 1306  I/O last 3 completed shifts:   In: 200 [P.O.:200]  Out: -     Safety Concerns:     [address] Safety Concerns:739463529}    Impairments/Disabilities:      [address] Impairments/Disabilities:196027390}    Nutrition Therapy:  Current Nutrition Therapy:   [address] Diet List:298224922}    Routes of Feeding: {CHP DME Other Feedings:092466986}  Liquids: {Slp liquid thickness:47690}  Daily Fluid Restriction: {CHP DME Yes amt example:391286280}  Last Modified Barium Swallow with Video (Video Swallowing Test): {Done Not Done YCXB:882808065}    Treatments at the Time of Hospital Discharge:   Respiratory Treatments: ***  Oxygen Therapy:  {Therapy; copd oxygen:92640}  Ventilator:    {MH CC Vent MXCA:287774390}    Rehab Therapies: {THERAPEUTIC INTERVENTION:4300799950}  Weight Bearing Status/Restrictions: 50Ivette GARAY Weight Bearin}  Other Medical Equipment (for information only, NOT a DME order):  {EQUIPMENT:873512176}  Other Treatments: ***    Patient's personal belongings (please select all that are sent with patient):  {P DME Belongings:511244262}    RN SIGNATURE:  {Esignature:750056660}    CASE MANAGEMENT/SOCIAL WORK SECTION    Inpatient Status Date: ***    Readmission Risk Assessment Score:  Readmission Risk              Risk of Unplanned Readmission:  0           Discharging to Facility/ Agency   Name: [address] [place] [address]  [phone]    Dialysis Facility (if applicable)   Name:  Address:  Dialysis Schedule:  Phone:  Fax:    / signature: Electronically signed by ROBINA Mccann on 2022 at 1:06 PM      PHYSICIAN SECTION    Prognosis: {Prognosis:8867085211}    Condition at Discharge: 50Ivette Patel Patient Condition:642377899}    Rehab Potential (if transferring to Rehab): {Prognosis:9091505279}    Recommended Labs or Other Treatments After Discharge: ***    Physician Certification: I certify the above information and transfer of Eden Jaime  is necessary for the continuing treatment of the diagnosis listed and that she requires [facility] for less 30 days.      Update Admission H&P: {CHP DME Changes in VBFVB:115271286}    PHYSICIAN SIGNATURE:  {Esignature:741241219}

## 2022-11-30 PROCEDURE — 2580000003 HC RX 258

## 2022-11-30 PROCEDURE — 6370000000 HC RX 637 (ALT 250 FOR IP): Performed by: GENERAL ACUTE CARE HOSPITAL

## 2022-11-30 PROCEDURE — 1200000000 HC SEMI PRIVATE

## 2022-11-30 PROCEDURE — G0378 HOSPITAL OBSERVATION PER HR: HCPCS

## 2022-11-30 PROCEDURE — 97110 THERAPEUTIC EXERCISES: CPT

## 2022-11-30 PROCEDURE — 97535 SELF CARE MNGMENT TRAINING: CPT

## 2022-11-30 PROCEDURE — 6370000000 HC RX 637 (ALT 250 FOR IP)

## 2022-11-30 RX ADMIN — Medication 10 ML: at 20:51

## 2022-11-30 RX ADMIN — ACETAMINOPHEN 650 MG: 325 TABLET ORAL at 09:33

## 2022-11-30 RX ADMIN — ASPIRIN 325 MG: 325 TABLET, COATED ORAL at 09:33

## 2022-11-30 RX ADMIN — ACETAMINOPHEN 650 MG: 325 TABLET ORAL at 19:33

## 2022-11-30 RX ADMIN — ASPIRIN 325 MG: 325 TABLET, COATED ORAL at 20:50

## 2022-11-30 ASSESSMENT — PAIN DESCRIPTION - ONSET: ONSET: ON-GOING

## 2022-11-30 ASSESSMENT — PAIN SCALES - GENERAL
PAINLEVEL_OUTOF10: 2
PAINLEVEL_OUTOF10: 4

## 2022-11-30 ASSESSMENT — PAIN DESCRIPTION - ORIENTATION: ORIENTATION: RIGHT

## 2022-11-30 ASSESSMENT — PAIN DESCRIPTION - LOCATION: LOCATION: KNEE

## 2022-11-30 ASSESSMENT — PAIN DESCRIPTION - DESCRIPTORS: DESCRIPTORS: SORE

## 2022-11-30 ASSESSMENT — PAIN DESCRIPTION - FREQUENCY: FREQUENCY: INTERMITTENT

## 2022-11-30 ASSESSMENT — PAIN DESCRIPTION - PAIN TYPE: TYPE: SURGICAL PAIN;ACUTE PAIN

## 2022-11-30 NOTE — CARE COORDINATION
11/30/2022  Social Work Discharge Planning:Plan is to go to [facility]. Precert was started yesterday. KAYA, PASRR and transport form are completed. COVID test is needed. Electronically signed by ROBINA Caceres on 11/30/2022 at 9:00 AM    11/30/2022 Social Work Discharge Planning:Pts insurance denied her skilled rehab stay at [place] [address]. Dr. Aman Dorado do a [id number].  notified Dr. Fox Grier and he plans on doing the P2P.  Electronically signed by ROBINA Caceres on 11/30/2022 at 2:56 PM

## 2022-11-30 NOTE — PROGRESS NOTES
Daily Treat      Evaluating PT:  Sin Lang PT, RRT, CEAS     Room #:  5157/6846-I  Diagnosis:  S/P Fall with closed displaced transverse fracture of right patella  Pertinent PMHx/PSHx:  See PMH  Procedure/Surgery:  ORIF RT Patellar 11/25/22  Precautions:  TDWB RTLE, Knee Immob AAT, No knee flexion,   Equipment Needs:  [employer]     SUBJECTIVE:     Pt lives with alone in a 1 story home with 4 stairs and 1 rail to enter. Pt ambulated with no AD PTA. Was highly indep prior to fall. OBJECTIVE:    Initial Evaluation  Date: 11/26/22 Treatment  11/30/22 Short Term/ Long Term   Goals   Was pt agreeable to Eval/treatment? Yes Yes      Does pt have pain? Min mid line patellar pain, otherwise no pain No complaints     Bed Mobility  Rolling: S/Indep  Supine to sit: S/Indep  Sit to supine: S/Indep  Scooting: indep Supine to sit: NT  Sit to supine: NT  Scooting: NT Indep all levels    Transfers Sit to stand: CGA/SBA  Stand to sit: SBA/CGA  Stand pivot: NA Sit to stand; NT  Stand to sit: NT SBA/CGA levels    Ambulation   20 feet with WW CGA, TDWB RTLE NT. See comments 40-50 feet with [employer] CGA TDWB RTLE   Stair negotiation: ascended and descended  NA  NA     ROM Knee immob in line   Knee immob in line No ROM RT Knee    MMT RT ankle 5/5       AM-PAC 6 Clicks 82/90  36/28 as per last functional Rx          Pt is alert and able to follow instruction  Balance: NT    Pt performed therapeutic exercise of the following: supine B ankle pumps, quad/glut sets AROM: R LE SLR motions, hip ABd/ADd AA/PROM x 20    Patient education/treatment  Pt was educated on exercise promoting circulation and strengthening. Pt encouraged to perform ankle pumps, quad/glut sets on her own in bed.     Patient response to education:   Pt verbalized understanding Pt demonstrated skill Pt requires further education in this area   yes With instruction yes     ASSESSMENT:   Comments: Pt found in bed, states already walked in the hallway with staff, states wants to stay in bed. Exercise performed. R LE knee immobilizer adjusted, again found positioned distally. Pt was left in bed with call light in reach    Time in 1335  Time out 1351     Total Treatment Time 16 minutes   CPT codes:     Therapeutic activities 06686 0 minutes   Therapeutic exercises 16947 16 minutes       Pt is making fair progress toward established Physical Therapy goals as per exercise participation. Continue with physical therapy current plan of care.     What They Like   License Number: [id number]

## 2022-11-30 NOTE — PROGRESS NOTES
Attempted to call [phone] to set up a [id number] review; no answer.   Left voicemail to call me back

## 2022-11-30 NOTE — PROGRESS NOTES
Occupational Therapy  OT BEDSIDE TREATMENT NOTE      Date:2022  Patient Name: Saeed Medina  MRN: [id number]  : 1945  Room: [address] [place] [address]     Evaluating OT: Teena Nunez OTR/L   [id number]       Referring Provider:Raad Marcano DO    Specific Provider Orders/Date:OT eval and treat 2022       Diagnosis:  Closed displaced transverse fracture of right patella, initial encounter [S82.031A]  Closed disp transverse fracture of right patella with routine healing [S82.031D]  sustained a fall from standing height in which she fell directly onto a flexed right knee.   X-rays revealed a completely displaced transverse fracture at the mid pole of the patella  2022  family has been staying with her to assist since then - they live out of town and have returned home    Procedure(s):  [address] [facility] 2022    Precautions:  Fall Risk, TTWB R LE, knee immobilizer       Assessment of current deficits    [x] Functional mobility            [x]ADLs           [x] Strength                  []Cognition    [x] Functional transfers          [x] IADLs         [x] Safety Awareness   [x]Endurance    [] Fine Coordination                         [x] Balance      [] Vision/perception   []Sensation      []Gross Motor Coordination             [] ROM           [] Delirium                   [] Motor Control      OT PLAN OF CARE   OT POC based on physician orders, patient diagnosis and results of clinical assessment     Frequency/Duration  2-4 days/wk for 2 weeks PRN   Specific OT Treatment Interventions to include:   ADL retraining/adapted techniques and AE recommendations to increase functional independence within precautions                    Energy conservation techniques to improve tolerance for selfcare routine   Functional transfer/mobility training/DME recommendations for increased independence, safety and fall prevention         Patient/family education to increase safety and functional independence             Environmental modifications for safe mobility and completion of ADLs                             Therapeutic activity to improve functional performance during ADLs. Therapeutic exercise to improve tolerance and functional strength for ADLs    Balance retraining/tolerance tasks for facilitation of postural control with dynamic challenges during ADLs . Positioning to improve functional independence  []      Recommended Adaptive Equipment: continue to assess      Home Living: Pt lives alone, 1 story with 4 steps/rail    Bathroom setup: walk in shower, grab bars, shower seat    Equipment owned: wheeled walker      Prior Level of Function: assist with ADLs , assist  with IADLs; ambulated with walker      Pain Level: no pain reported. Cognition: Awake and alert. Cues for safety. Functional Assessment:  AM-PAC Daily Activity Raw Score: 16/24    Initial Eval Status  Date: 11/25/22 Treatment Status  Date:11/30/22  STGs = LTGs  Time frame: 10-14 days   Feeding Independent        Grooming Set-up ,seated  SBA for balance while standing at a sink. Independent    UB Dressing Set-up    Independent    LB Dressing Mod A  Min A to thread clothing over R LE     Mod I    Bathing Min A    Mod I    Toileting SBA  Seated on commode  SBA for safety during transfer. Pt able to complete toilet hygiene. Mod I    Bed Mobility  SBA  Supine <> sit  SBA supine <> sit   Mod I    Functional Transfers CGA  Sit - stand from bed, BSC  SBA and cues for safety technique. Mod I    Functional Mobility CGA,w/walker   Knee immobilizer on   SPT to<> from bed/BSC  Able to side step to [facility] - patient educated on TTWB - leary of putting to much weight down so patient stayed NWB and hopped the short distance   Continue to reinforce TTWB precaution  SBA using w/w to and from bathroom.    Mod I  with good tolerance    Balance Sitting:     Static:  Independent Dynamic:SBA   Standing: CGA    Independent    Activity Tolerance No SOB  Fair   Good  with ADL activity        Comments:  Pt agreeable to therapy this AM.  Verbalized that she is upset that she has not left for rehab facility. Completed sponge bathe while standing at the sink. Donned underwear with assist to thread over immobilizer brace. Will require use of AE for LB dressing. Pt requested to return to bed at the end of the session. Education/treatment:  ADL retraining with facilitation of movement to increase self care skills. Therapeutic activity to address balance and endurance for ADL and transfers. Pt education of walker safety and transfer safety. Pt has made  progress towards set goals.        Time In: 8:15  Time Out: 8:40      Min Units   Therapeutic Ex 84539     Therapeutic Activities 14767 5    ADL/Self Care 72073 20 2   Orthotic Management 61687     Neuro Re-Ed 18063     Non-Billable Time     TOTAL TIMED TREATMENT [address] [facility] JUDITH/L [id number]

## 2022-12-01 PROCEDURE — 6370000000 HC RX 637 (ALT 250 FOR IP): Performed by: GENERAL ACUTE CARE HOSPITAL

## 2022-12-01 PROCEDURE — G0378 HOSPITAL OBSERVATION PER HR: HCPCS

## 2022-12-01 PROCEDURE — 6370000000 HC RX 637 (ALT 250 FOR IP)

## 2022-12-01 PROCEDURE — 2580000003 HC RX 258

## 2022-12-01 PROCEDURE — 1200000000 HC SEMI PRIVATE

## 2022-12-01 PROCEDURE — 97110 THERAPEUTIC EXERCISES: CPT

## 2022-12-01 RX ADMIN — Medication 10 ML: at 22:14

## 2022-12-01 RX ADMIN — ACETAMINOPHEN 650 MG: 325 TABLET ORAL at 08:42

## 2022-12-01 RX ADMIN — Medication 10 ML: at 08:41

## 2022-12-01 RX ADMIN — ACETAMINOPHEN 650 MG: 325 TABLET ORAL at 22:11

## 2022-12-01 RX ADMIN — ASPIRIN 325 MG: 325 TABLET, COATED ORAL at 22:11

## 2022-12-01 RX ADMIN — ASPIRIN 325 MG: 325 TABLET, COATED ORAL at 08:41

## 2022-12-01 ASSESSMENT — PAIN DESCRIPTION - ONSET
ONSET: ON-GOING
ONSET: GRADUAL

## 2022-12-01 ASSESSMENT — PAIN DESCRIPTION - DESCRIPTORS
DESCRIPTORS: ACHING
DESCRIPTORS: SORE

## 2022-12-01 ASSESSMENT — PAIN DESCRIPTION - LOCATION
LOCATION: KNEE
LOCATION: KNEE

## 2022-12-01 ASSESSMENT — PAIN SCALES - GENERAL
PAINLEVEL_OUTOF10: 2
PAINLEVEL_OUTOF10: 2
PAINLEVEL_OUTOF10: 0

## 2022-12-01 ASSESSMENT — PAIN DESCRIPTION - FREQUENCY
FREQUENCY: INTERMITTENT
FREQUENCY: INTERMITTENT

## 2022-12-01 ASSESSMENT — PAIN DESCRIPTION - ORIENTATION
ORIENTATION: RIGHT
ORIENTATION: RIGHT

## 2022-12-01 ASSESSMENT — PAIN DESCRIPTION - PAIN TYPE
TYPE: SURGICAL PAIN
TYPE: ACUTE PAIN;SURGICAL PAIN

## 2022-12-01 NOTE — CARE COORDINATION
12/1/2022  Social Work Discharge Planning:Insurance denied Pts skilled stay at [address] Yane Asif is working on doing the [employer]. KAYA and transport form are completed. COVID test is needed day of discharge. Electronically signed by ROBINA Torres on 12/1/2022 at 9:32 AM'

## 2022-12-01 NOTE — PROGRESS NOTES
Daily Treat      Evaluating PT:  Sukhjinder Lerner PT, RRT, CEAS     Room #:  6324/7342-Z  Diagnosis:  S/P Fall with closed displaced transverse fracture of right patella  Pertinent PMHx/PSHx:  See PMH  Procedure/Surgery:  ORIF RT Patellar 11/25/22  Precautions:  TDWB RTLE, Knee Immob AAT, No knee flexion,   Equipment Needs:  [employer]     SUBJECTIVE:     Pt lives with alone in a 1 story home with 4 stairs and 1 rail to enter. Pt ambulated with no AD PTA. Was highly indep prior to fall. OBJECTIVE:    Initial Evaluation  Date: 11/26/22 Treatment  11/31/22 Short Term/ Long Term   Goals   Was pt agreeable to Eval/treatment? Yes Yes      Does pt have pain? Min mid line patellar pain, otherwise no pain No complaints     Bed Mobility  Rolling: S/Indep  Supine to sit: S/Indep  Sit to supine: S/Indep  Scooting: indep Supine to sit: NT  Sit to supine: NT  Scooting: NT Indep all levels    Transfers Sit to stand: CGA/SBA  Stand to sit: SBA/CGA  Stand pivot: NA Sit to stand; NT  Stand to sit: NT Independent    Ambulation   20 feet with WW CGA, TDWB RTLE NT. See comments  150 feet with ww TDWB RLE Independent    Stair negotiation: ascended and descended  NA  NA 4 steps with 1 rail and AAD if needed    ROM Knee immob in line   Knee immob in line No ROM RT Knee    MMT RT ankle 5/5       AM-PAC 6 Clicks 59/66  22/91 as per last functional Rx          Pt is alert and able to follow instruction  Balance: NT    Pt performed therapeutic exercise of the following: supine B ankle pumps, quad/glut sets AROM: R LE SLR motions, hip ABd/ADd AA/PROM x 20    Patient education/treatment  Pt was educated on exercise promoting circulation and strengthening. Pt encouraged to perform ankle pumps, quad/glut sets on her own in bed.     Patient response to education:   Pt verbalized understanding Pt demonstrated skill Pt requires further education in this area   yes With instruction yes     ASSESSMENT:   Comments: Pt found in bed, states already walked to and from the bathroom 2 times today, is tired, states wants to stay in bed, requesting to perform exercise. Exercise performed. R LE knee immobilizer adjusted, again found positioned distally, Ice applied. Pt was left in bed per request as found with call light in reach    Time in 1418  Time out 1432     Total Treatment Time 14 minutes   CPT codes:     Therapeutic activities 19400 0 minutes   Therapeutic exercises 49539 14 minutes       Pt is making fair progress toward established Physical Therapy goals as per exercise participation. Continue with physical therapy current plan of care. Pt encouraged to participate with functional mobility tomorrow during PT session. Mack Coto PTA   License Number: [id number]    12/2/22  PT goals were updated today based on pt's home set-up and performances with PTA during this weeks PT treatment sessions. Miles Corado., P.T.   License Number: [id number]

## 2022-12-01 NOTE — PLAN OF CARE
Problem: Discharge Planning  Goal: Discharge to home or other facility with appropriate resources  11/30/2022 2056 by Keyla Angeles RN  Outcome: Progressing  11/30/2022 1021 by Mani Arellano RN  Outcome: Progressing     Problem: Pain  Goal: Verbalizes/displays adequate comfort level or baseline comfort level  11/30/2022 2056 by Keyla Angeles RN  Outcome: Progressing  11/30/2022 1021 by Mani Arellano RN  Outcome: Progressing     Problem: Safety - Adult  Goal: Free from fall injury  11/30/2022 2056 by Keyla Angeles RN  Outcome: Progressing  11/30/2022 1021 by Mani Arellano RN  Outcome: Progressing     Problem: ABCDS Injury Assessment  Goal: Absence of physical injury  11/30/2022 2056 by Keyla Angeles RN  Outcome: Progressing  11/30/2022 1021 by Mani Arellano RN  Outcome: Progressing

## 2022-12-01 NOTE — PROGRESS NOTES
Regarding peer to peer - this has been denied and upheld by reviewer. I was told by [facility] that there is nothing further that I am able to do.

## 2022-12-01 NOTE — PROGRESS NOTES
[facility]   Progress note      Subjective: The patient is awake and alert. No acute events overnight. Denies chest pain, angina, SOB   No acute complaints except for pain to be expected postop  Waiting for discharge    Objective:    /82   Pulse 63   Temp 97.5 °F (36.4 °C) (Oral)   Resp 16   Ht 5' 6\" (1.676 m)   Wt 162 lb (73.5 kg)   SpO2 97%   BMI 26.15 kg/m²     No intake/output data recorded. No intake/output data recorded. General appearance: NAD, conversant  HEENT: AT/NC, MMM  Neck: FROM, supple  Lungs: Clear to auscultation  CV: RRR, no MRGs  Vasc: Radial pulses 2+  Abdomen: Soft, non-tender; no masses or HSM  Extremities: Status post right TKA  Skin: no rash, lesions or ulcers  Psych: Alert and oriented to person, place and time  Neuro: Alert and interactive     No results for input(s): WBC, HGB, HCT, PLT in the last 72 hours. No results for input(s): NA, K, CL, CO2, BUN, CREATININE, GLU, CALCIUM in the last 72 hours. Assessment:    Principal Problem:    Closed disp transverse fracture of right patella with routine healing  Resolved Problems:    * No resolved hospital problems.  *      Plan:    79-year-old female experienced mechanical fall resulting in a fracture of her right patella     S/p-11/25-ORIF right patella  WBC-14.4-now resolved and down to 9.0 last check  Vital signs stable  PT OT evaluate for subacute rehab  ASA per orthopedic surgery for DVT prophylaxis  Monitor closely as patient has a history of DVTs  Monitor H&H- remained completely stable since admission, 12/38 2 days ago  Patient is okay for discharge from medicine standpoint once rehab arrangements are made  No acute issues from medicine standpoint-recheck H&H in the morning if she is still here     Code Status: Full  Consultants: Orthopedics is admitting service  DVT Prophylaxis   PT/OT  Discharge planning           Tony Aleman MD  6:27 PM  12/1/2022

## 2022-12-02 LAB
BASOPHILS ABSOLUTE: 0.05 E9/L (ref 0–0.2)
BASOPHILS RELATIVE PERCENT: 0.8 % (ref 0–2)
EOSINOPHILS ABSOLUTE: 0.52 E9/L (ref 0.05–0.5)
EOSINOPHILS RELATIVE PERCENT: 8 % (ref 0–6)
HCT VFR BLD CALC: 42.6 % (ref 34–48)
HEMOGLOBIN: 13.3 G/DL (ref 11.5–15.5)
IMMATURE GRANULOCYTES #: 0.04 E9/L
IMMATURE GRANULOCYTES %: 0.6 % (ref 0–5)
LYMPHOCYTES ABSOLUTE: 1.83 E9/L (ref 1.5–4)
LYMPHOCYTES RELATIVE PERCENT: 28.1 % (ref 20–42)
MCH RBC QN AUTO: 29.8 PG (ref 26–35)
MCHC RBC AUTO-ENTMCNC: 31.2 % (ref 32–34.5)
MCV RBC AUTO: 95.3 FL (ref 80–99.9)
MONOCYTES ABSOLUTE: 0.58 E9/L (ref 0.1–0.95)
MONOCYTES RELATIVE PERCENT: 8.9 % (ref 2–12)
NEUTROPHILS ABSOLUTE: 3.49 E9/L (ref 1.8–7.3)
NEUTROPHILS RELATIVE PERCENT: 53.6 % (ref 43–80)
PDW BLD-RTO: 12.3 FL (ref 11.5–15)
PLATELET # BLD: 336 E9/L (ref 130–450)
PMV BLD AUTO: 8.8 FL (ref 7–12)
RBC # BLD: 4.47 E12/L (ref 3.5–5.5)
REASON FOR REJECTION: NORMAL
REJECTED TEST: NORMAL
WBC # BLD: 6.5 E9/L (ref 4.5–11.5)

## 2022-12-02 PROCEDURE — 6370000000 HC RX 637 (ALT 250 FOR IP): Performed by: GENERAL ACUTE CARE HOSPITAL

## 2022-12-02 PROCEDURE — 85025 COMPLETE CBC W/AUTO DIFF WBC: CPT

## 2022-12-02 PROCEDURE — G0378 HOSPITAL OBSERVATION PER HR: HCPCS

## 2022-12-02 PROCEDURE — 36415 COLL VENOUS BLD VENIPUNCTURE: CPT

## 2022-12-02 PROCEDURE — 97530 THERAPEUTIC ACTIVITIES: CPT

## 2022-12-02 PROCEDURE — 6370000000 HC RX 637 (ALT 250 FOR IP)

## 2022-12-02 PROCEDURE — 97535 SELF CARE MNGMENT TRAINING: CPT

## 2022-12-02 PROCEDURE — 2580000003 HC RX 258

## 2022-12-02 PROCEDURE — 1200000000 HC SEMI PRIVATE

## 2022-12-02 RX ADMIN — Medication 5 ML: at 09:22

## 2022-12-02 RX ADMIN — Medication 10 ML: at 08:51

## 2022-12-02 RX ADMIN — ASPIRIN 325 MG: 325 TABLET, COATED ORAL at 21:24

## 2022-12-02 RX ADMIN — ACETAMINOPHEN 650 MG: 325 TABLET ORAL at 08:50

## 2022-12-02 RX ADMIN — ACETAMINOPHEN 650 MG: 325 TABLET ORAL at 21:24

## 2022-12-02 RX ADMIN — ASPIRIN 325 MG: 325 TABLET, COATED ORAL at 08:50

## 2022-12-02 RX ADMIN — Medication 10 ML: at 21:24

## 2022-12-02 ASSESSMENT — PAIN DESCRIPTION - DESCRIPTORS: DESCRIPTORS: ACHING

## 2022-12-02 ASSESSMENT — PAIN SCALES - GENERAL
PAINLEVEL_OUTOF10: 1
PAINLEVEL_OUTOF10: 0

## 2022-12-02 ASSESSMENT — PAIN DESCRIPTION - ORIENTATION: ORIENTATION: RIGHT

## 2022-12-02 ASSESSMENT — PAIN DESCRIPTION - LOCATION: LOCATION: KNEE

## 2022-12-02 NOTE — PROGRESS NOTES
[facility] Quality Flow/Interdisciplinary Rounds Progress Note        Quality Flow Rounds held on December 2, 2022    Disciplines Attending:  Bedside Nurse, , , and Nursing Unit Leadership    Rashid Bull was admitted on 11/25/2022  6:56 AM    Anticipated Discharge Date:  12/3/2022    Disposition:    Jevon Score:  Jevon Scale Score: 19    Readmission Risk              Risk of Unplanned Readmission:  8           Discussed patient goal for the day, patient clinical progression, and barriers to discharge.   The following Goal(s) of the Day/Commitment(s) have been identified:   pain control, safety precautions post op, discharge [address] [facility], RN  December 2, 2022

## 2022-12-02 NOTE — PLAN OF CARE
Problem: Discharge Planning  Goal: Discharge to home or other facility with appropriate resources  12/2/2022 0938 by Anna Marie Abdul RN  Outcome: Progressing     Problem: Pain  Goal: Verbalizes/displays adequate comfort level or baseline comfort level  12/2/2022 0938 by Anna Marie Abdul RN  Outcome: Progressing     Problem: Safety - Adult  Goal: Free from fall injury  12/2/2022 0938 by Anna Marie Abdul RN  Outcome: Progressing

## 2022-12-02 NOTE — PROGRESS NOTES
Education Documentation  General Self Care, taught by Mani Arellano RN at 12/2/2022 12:38 AM.  Learner: Patient  Readiness: Acceptance  Method: Explanation  Response: Verbalizes Understanding    INSTRUCT  ON USE OF SAFETY DEVICES, taught by Mani Arellano RN at 12/2/2022 12:38 AM.  Learner: Patient  Readiness: Acceptance  Method: Explanation  Response: Verbalizes Understanding    Medication Safety, taught by Mani Arellano RN at 12/2/2022 12:38 AM.  Learner: Patient  Readiness: Acceptance  Method: Explanation  Response: Verbalizes Understanding    Fall Prevention, taught by Mani Arellano RN at 12/2/2022 12:38 AM.  Learner: Patient  Readiness: Acceptance  Method: Explanation  Response: Verbalizes Understanding    Educate ambulation safety, taught by Mani Arellano RN at 12/2/2022 12:38 AM.  Learner: Patient  Readiness: Acceptance  Method: Explanation  Response: Verbalizes Understanding    Educate incentive spirometry usage, taught by Mani Arellano RN at 12/2/2022 12:38 AM.  Learner: Patient  Readiness: Acceptance  Method: Explanation  Response: Verbalizes Understanding    Educate relaxation techniques, taught by Mani Arellano RN at 12/2/2022 12:38 AM.  Learner: Patient  Readiness: Acceptance  Method: Explanation  Response: Verbalizes Understanding    Educate pharmacologic pain management, taught by Mani Arellano RN at 12/2/2022 12:38 AM.  Learner: Patient  Readiness: Acceptance  Method: Explanation  Response: Verbalizes Understanding    Educate pain scale for assessing level of pain, taught by Mani Arellano RN at 12/2/2022 12:38 AM.  Learner: Patient  Readiness: Acceptance  Method: Explanation  Response: Verbalizes Understanding    Educate non-pharmacologic comfort measures, taught by Mani Arellano RN at 12/2/2022 12:38 AM.  Learner: Patient  Readiness: Acceptance  Method: Explanation  Response: Verbalizes Understanding    Educate bed mobility, taught by Mani Arellano RN at 12/2/2022 12:38 AM.  Learner: Patient  Readiness: Acceptance  Method: Explanation  Response: Verbalizes Understanding    Haseeb Los, taught by Ashley Alford RN at 11/30/2022 10:21 AM.  Learner: Patient  Readiness: Acceptance  Method: Explanation  Response: Verbalizes Understanding    Potassium Bicarb-Citric Acid, taught by Ashley Alford RN at 11/30/2022 10:21 AM.  Learner: Patient  Readiness: Acceptance  Method: Explanation  Response: Verbalizes Understanding    Potassium Chloride [facility], taught by Ashley Alford RN at 11/30/2022 10:21 AM.  Learner: Patient  Readiness: Acceptance  Method: Explanation  Response: Verbalizes Understanding    Potassium Chloride, taught by Ashley Alford RN at 11/30/2022 10:21 AM.  Learner: Patient  Readiness: Acceptance  Method: Explanation  Response: Verbalizes Understanding    Acetaminophen, taught by Ashley Alford RN at 11/30/2022 10:21 AM.  Learner: Patient  Readiness: Acceptance  Method: Explanation  Response: Verbalizes Understanding    oxyCODONE HCl, taught by Ashley Alford RN at 11/30/2022 10:21 AM.  Learner: Patient  Readiness: Acceptance  Method: Explanation  Response: Verbalizes Understanding    Morphine Sulfate, taught by Ashley Alford RN at 11/30/2022 10:21 AM.  Learner: Patient  Readiness: Acceptance  Method: Explanation  Response: Verbalizes Understanding    Polyethylene Glycol 3350, taught by Ashley Alford RN at 11/30/2022 10:21 AM.  Learner: Patient  Readiness: Acceptance  Method: Explanation  Response: Verbalizes Understanding    Sunscreens, taught by Ashley Alford RN at 11/30/2022 10:21 AM.  Learner: Patient  Readiness: Acceptance  Method: Explanation  Response: Verbalizes Understanding    Ondansetron HCl, taught by Ashley Alford RN at 11/30/2022 10:21 AM.  Learner: Patient  Readiness: Acceptance  Method: Explanation  Response: Verbalizes Understanding    Ropivacaine HCl, taught by Ashley Alford RN at 11/30/2022 10:21 AM.  Learner: Patient  Readiness: Acceptance  Method: Explanation  Response: Verbalizes Understanding    Midazolam HCl, taught by Caity Bradshaw RN at 11/30/2022 10:21 AM.  Learner: Patient  Readiness: Acceptance  Method: Explanation  Response: Verbalizes Understanding    fentaNYL Citrate, taught by Caity Bradshaw RN at 11/30/2022 10:21 AM.  Learner: Patient  Readiness: Acceptance  Method: Explanation  Response: Verbalizes Understanding    Sodium Chloride, taught by Caity Bradshaw RN at 11/30/2022 10:21 AM.  Learner: Patient  Readiness: Acceptance  Method: Explanation  Response: Verbalizes Understanding    ceFAZolin Sodium, taught by Caity Bradshaw RN at 11/30/2022 10:21 AM.  Learner: Patient  Readiness: Acceptance  Method: Explanation  Response: Verbalizes Understanding    General Self Care, taught by Caity Bradshaw RN at 11/30/2022 10:21 AM.  Learner: Patient  Readiness: Acceptance  Method: Explanation  Response: Verbalizes Understanding    Tests, taught by Caity Bradshaw RN at 11/30/2022 10:21 AM.  Learner: Patient  Readiness: Acceptance  Method: Explanation  Response: Verbalizes Understanding    General medication information, taught by Caity Bradshaw RN at 11/30/2022 10:21 AM.  Learner: Patient  Readiness: Acceptance  Method: Explanation  Response: Verbalizes Understanding    Activity, taught by Caity Bradshaw RN at 11/30/2022 10:21 AM.  Learner: Patient  Readiness: Acceptance  Method: Explanation  Response: Verbalizes Understanding    Diet Instruction, taught by Caity Bradshaw RN at 11/30/2022 10:21 AM.  Learner: Patient  Readiness: Acceptance  Method: Explanation  Response: Verbalizes Understanding    Medical Equipment, taught by Caity Bradshaw RN at 11/30/2022 10:21 AM.  Learner: Patient  Readiness: Acceptance  Method: Explanation  Response: Verbalizes Understanding    Pressure Stockings, taught by Caity Bradshaw RN at 11/30/2022 10:21 AM.  Learner: Patient  Readiness: Acceptance  Method: Explanation  Response: Joon Gómez Understanding    Sequential Compression Device, taught by Kyele Posadas RN at 11/30/2022 10:21 AM.  Learner: Patient  Readiness: Acceptance  Method: Explanation  Response: Verbalizes Understanding    Anticoagulant Therapy Diet, taught by Kylee Posadas RN at 11/30/2022 10:21 AM.  Learner: Patient  Readiness: Acceptance  Method: Explanation  Response: Verbalizes Understanding    ANTICOAGULANT THERAPY IP - Med, taught by Kylee Posadas RN at 11/30/2022 10:21 AM.  Learner: Patient  Readiness: Acceptance  Method: Explanation  Response: Verbalizes Understanding    Non-Pharmacological Comfort Measures, taught by Kylee Posadas RN at 11/30/2022 10:21 AM.  Learner: Patient  Readiness: Acceptance  Method: Explanation  Response: Verbalizes Understanding    Pain Medication Actions & Side Effects, taught by Kylee Posadas RN at 11/30/2022 10:21 AM.  Learner: Patient  Readiness: Acceptance  Method: Explanation  Response: Verbalizes Understanding    Pain Control, taught by Kylee Posadas RN at 11/30/2022 10:21 AM.  Learner: Patient  Readiness: Acceptance  Method: Explanation  Response: Verbalizes Understanding    Pain Rating Scale, taught by Kylee Posadas RN at 11/30/2022 10:21 AM.  Learner: Patient  Readiness: Acceptance  Method: Explanation  Response: Verbalizes Understanding    INSTRUCT  ON USE OF SAFETY DEVICES, taught by Kylee Posadas RN at 11/30/2022 10:21 AM.  Learner: Patient  Readiness: Acceptance  Method: Explanation  Response: Verbalizes Understanding    Medication Safety, taught by Kylee Posadas RN at 11/30/2022 10:21 AM.  Learner: Patient  Readiness: Acceptance  Method: Explanation  Response: Verbalizes Understanding    Fall Prevention, taught by Kylee Posadas RN at 11/30/2022 10:21 AM.  Learner: Patient  Readiness: Acceptance  Method: Explanation  Response: Verbalizes Understanding    Day 5, taught by Kylee Posadas RN at 11/30/2022 10:21 AM.  Learner: Patient  Readiness: Acceptance  Method: Explanation  Response: Verbalizes Understanding    Day 4, taught by Ashley Alford RN at 11/30/2022 10:21 AM.  Learner: Patient  Readiness: Acceptance  Method: Explanation  Response: Verbalizes Understanding    Day 3, taught by Ashley Alford RN at 11/30/2022 10:21 AM.  Learner: Patient  Readiness: Acceptance  Method: Explanation  Response: Arpit Mercedes Understanding    Day 2, taught by Ashley Alford RN at 11/30/2022 10:21 AM.  Learner: Patient  Readiness: Acceptance  Method: Explanation  Response: Arpit Mercedes Understanding    Day 1, taught by Ashley Alford RN at 11/30/2022 10:21 AM.  Learner: Patient  Readiness: Acceptance  Method: Explanation  Response: Verbalizes Understanding    General Self Care, taught by Ashley Alford RN at 11/28/2022 12:36 PM.  Learner: Family, Patient  Readiness: Acceptance  Method: Explanation  Response: Verbalizes Understanding    Pressure Stockings, taught by Ashley Alford RN at 11/28/2022 12:36 PM.  Learner: Family, Patient  Readiness: Acceptance  Method: Explanation  Response: Verbalizes Understanding    Sequential Compression Device, taught by Ashley Alford RN at 11/28/2022 12:36 PM.  Learner: Family, Patient  Readiness: Acceptance  Method: Explanation  Response: Verbalizes Understanding    Anticoagulant Therapy Diet, taught by Ashley Alford RN at 11/28/2022 12:36 PM.  Learner: Family, Patient  Readiness: Acceptance  Method: Explanation  Response: Verbalizes Understanding    ANTICOAGULANT THERAPY IP - Med, taught by Ashley Alford RN at 11/28/2022 12:36 PM.  Learner: Family, Patient  Readiness: Acceptance  Method: Explanation  Response: Verbalizes Understanding    Non-Pharmacological Comfort Measures, taught by Ashley Alford RN at 11/28/2022 12:36 PM.  Learner: Family, Patient  Readiness: Acceptance  Method: Explanation  Response: Verbalizes Understanding    Pain Medication Actions & Side Effects, taught by Ashley Alford RN at 11/28/2022 12:36 PM.  Learner: Family, Patient  Readiness: Acceptance  Method: Explanation  Response: Verbalizes Understanding    Pain Control, taught by Homer Apgar, RN at 2022 12:36 PM.  Learner: Family, Patient  Readiness: Acceptance  Method: Explanation  Response: Verbalizes Understanding    Pain Rating Scale, taught by Homer Apgar, RN at 2022 12:36 PM.  Learner: Family, Patient  Readiness: Acceptance  Method: Explanation  Response: Judith Reining Understanding    INSTRUCT  ON USE OF SAFETY DEVICES, taught by Homer Apgar, RN at 2022 12:36 PM.  Learner: Family, Patient  Readiness: Acceptance  Method: Explanation  Response: Verbalizes Understanding    Medication Safety, taught by Homer Apgar, RN at 2022 12:36 PM.  Learner: Family, Patient  Readiness: Acceptance  Method: Explanation  Response: Verbalizes Understanding    Fall Prevention, taught by Homer Apgar, RN at 2022 12:36 PM.  Learner: Family, Patient  Readiness: Acceptance  Method: Explanation  ResponseBuddy Reddy Understanding    Day 3, taught by Homer Apgar, RN at 2022 12:36 PM.  Learner: Family, Patient  Readiness: Acceptance  Method: Explanation  ResponseBuddy Reddy Understanding    Day 2, taught by Homer Apgar, RN at 2022 12:36 PM.  Learner: Family, Patient  Readiness: Acceptance  Method: Explanation  ResponseBuddy Reddy Understanding    Day 1, taught by Homer Apgar, RN at 2022 12:36 PM.  Learner: Family, Patient  Readiness: Acceptance  Method: Explanation  Response: Verbalizes Understanding    Education Comments  No comments found.

## 2022-12-02 NOTE — PROGRESS NOTES
[facility]   Progress note      Subjective: The patient is awake and alert. Sitting up in bed. No complaints of pain. No acute events overnight. Denies chest pain, angina, SOB       Objective:    /60   Pulse 61   Temp 98.5 °F (36.9 °C) (Oral)   Resp 16   Ht 5' 6\" (1.676 m)   Wt 162 lb (73.5 kg)   SpO2 96%   BMI 26.15 kg/m²     No intake/output data recorded. No intake/output data recorded. General appearance: NAD, conversant  HEENT: AT/NC, MMM  Neck: FROM, supple  Lungs: Clear to auscultation  CV: RRR, no MRGs  Vasc: Radial pulses 2+  Abdomen: Soft, non-tender; no masses or HSM  Extremities: Status post right TKA  Skin: no rash, lesions or ulcers  Psych: Alert and oriented to person, place and time  Neuro: Alert and interactive     Recent Labs     12/02/22  0751   WBC 6.5   HGB 13.3   HCT 42.6            No results for input(s): NA, K, CL, CO2, BUN, CREATININE, GLU, CALCIUM in the last 72 hours. Assessment:    Principal Problem:    Closed disp transverse fracture of right patella with routine healing  Resolved Problems:    * No resolved hospital problems. *      Plan:    67-year-old female experienced mechanical fall resulting in a fracture of her right patella     S/p-11/25-ORIF right patella  WBC-14.4-now resolved and down to 9.0 last check  Vital signs stable  PT OT evaluate for subacute rehab  ASA per orthopedic surgery for DVT prophylaxis  Monitor closely as patient has a history of DVTs  Monitor H&H- remained completely stable since admission, 12/38 2 days ago  Patient is okay for discharge from medicine standpoint once rehab arrangements are made  No acute issues from medicine standpoint-recheck H&H in the morning if she is still here    12/2/2022  Peer to Peer denied  Patient to go home in am with family  Patient is medically stable for discharge.      Code Status: Full  Consultants: Orthopedics is admitting service    DVT Prophylaxis PT/OT  Discharge planning     RACHELLE Barboza - CNP  11:53 AM  12/2/2022     Above note edited to reflect my thoughts     I personally saw, examined and provided care for the patient. Radiographs, labs and medication list were reviewed by me independently. The case was discussed in detail and plans for care were established. Review of Lesly BUSH- CNP, documentation was conducted and revisions were made as appropriate directly by me. I agree with the above documented exam, problem list, and plan of care.      Sam Wolfe MD  12:39 PM  12/2/2022

## 2022-12-02 NOTE — PROGRESS NOTES
Daily Treat      Evaluating PT:  Eloisa Huang PT, RRT, CEAS     Room #:  4665/2503-S  Diagnosis:  S/P Fall with closed displaced transverse fracture of right patella  Pertinent PMHx/PSHx:  See PMH  Procedure/Surgery:  ORIF RT Patellar 11/25/22  Precautions:  TDWB RTLE, Knee Immob AAT, No knee flexion,   Equipment Needs:  [address]     SUBJECTIVE:     Pt lives with alone in a 1 story home with 4 stairs and 1 rail to enter. Pt ambulated with no AD PTA. Was highly indep prior to fall. OBJECTIVE:    Initial Evaluation  Date: 11/26/22 Treatment  12/2/22 Short Term/ Long Term   Goals   Was pt agreeable to Eval/treatment? Yes Yes      Does pt have pain? Min mid line patellar pain, otherwise no pain No complaints     Bed Mobility  Rolling: S/Indep  Supine to sit: S/Indep  Sit to supine: S/Indep  Scooting: indep Supine to sit: Supervision  Sit to supine: Supervision  Scooting: Supervision to EOB.    Self assist for R LE Indep all levels    Transfers Sit to stand: CGA/SBA  Stand to sit: SBA/CGA  Stand pivot: NA Sit to stand; Sit <> stand; SBA   Independent    Ambulation   20 feet with WW CGA, TDWB RTLE 80 feet x 2 + 15 feet x 2 using [facility] for support SBA  150 feet with ww TDWB RLE Independent    Stair negotiation: ascended and descended  NA 4 steps x 2 with rail/crutch for support CG/SBA, step to pattern used 4 steps with 1 rail and AAD if needed    ROM Knee immob in line   Knee immob in line No ROM RT Knee    MMT RT ankle 5/5       AM-PAC 6 Clicks 37/60  95/23           Pt is alert and able to follow instruction  Balance: fair dynamic using WW for support    Pt performed therapeutic exercise of the following: NT  Patient education/treatment  Pt was educated on step negotiation promoting sequence and crutch placement, maintaining TDWB R LE during gait and step negotiation  Patient response to education:   Pt verbalized understanding Pt demonstrated skill Pt requires further education in this area   yes With repeated instruction yes     ASSESSMENT:   Comments: Knee immobilizer donned all rx. Gait slow and consistent, step to pattern used, Pt guarding WB R LE throughout, much instruction given to promote TDWB as able, Pt states doing her best to maintain this. Steps performed with difficulty, much instruction again to maintain TDWB. Pt states her [place] Daughter is coming home to stay with her and she is a PT, states will have good support system at home, states she can safely get into the house with support. Pt was left in bed per request as found with call light in reach    Time in 0939  Time out 1009     Total Treatment Time 30 minutes   CPT codes:     Therapeutic activities 51622 30 minutes   Therapeutic exercises 76580 0 minutes       Pt is making good  progress toward established Physical Therapy goals as per functional mobility performed. Continue with physical therapy current plan of care. Pt in need of strong support at home  Bernarda Torres PTA   License Number: [id number]    12/2/22  PT goals were updated today based on pt's home set-up and performances with PTA during this weeks PT treatment sessions. Lisbeth Orr, P.T.   License Number: [id number]

## 2022-12-02 NOTE — CARE COORDINATION
12/2/2022 Social Work Discharge Planning:Pts P2P for Pt to go to [address] [place] [address] was denied and upheld by insurance, per physician note. Insurance said there is nothing further that we are able to do. SW discussed this with Pt and she said her plan is now to go back home with Lauri Cotto. SW made a referral to [facility]. Waiting reply. C order is needed. Pt is also requesting a quad cane. Referral was made to [address] [place] [address] DME-they will deliver to the home next week. Order is needed. SW notified [facility]. JOHN liaison. Pts daughter will transport Pt home and will be staying with her for several days. Pt has a ww. Electronically signed by ROBINA Valiente on 12/2/2022 at 10:31 AM    The Plan for Transition of Care is related to the following treatment goals: hhc and DME    The Patient  was provided with a choice of provider and agrees   with the discharge plan. [x] Yes [] No    Freedom of choice list was provided with basic dialogue that supports the patient's individualized plan of care/goals, treatment preferences and shares the quality data associated with the providers. [x] Yes [] No    12/2/2022  Social Work Discharge Planning:[facility] [address] is out of network with Pts insurance. SW is trying to find a [facility] that accepts Pts insurance. Referral was made to [facility]. Waiting reply. Electronically signed by ROBINA Valiente on 12/2/2022 at 12:34 PM

## 2022-12-02 NOTE — PROGRESS NOTES
Occupational Therapy  OT BEDSIDE TREATMENT NOTE      Date:2022  Patient Name: Allison Ortega  MRN: [id number]  : 1945  Room: [address] [place] [address]     Evaluating OT: Patricia Will OTR/L   [id number]       Referring Provider:Raad Herrera DO    Specific Provider Orders/Date:OT eval and treat 2022       Diagnosis:  Closed displaced transverse fracture of right patella, initial encounter [S82.031A]  Closed disp transverse fracture of right patella with routine healing [S82.031D]  sustained a fall from standing height in which she fell directly onto a flexed right knee.   X-rays revealed a completely displaced transverse fracture at the mid pole of the patella  2022  family has been staying with her to assist since then - they live out of town and have returned home    Procedure(s):  [address] [facility] 2022    Precautions:  Fall Risk, TTWB R LE, knee immobilizer       Assessment of current deficits    [x] Functional mobility            [x]ADLs           [x] Strength                  []Cognition    [x] Functional transfers          [x] IADLs         [x] Safety Awareness   [x]Endurance    [] Fine Coordination                         [x] Balance      [] Vision/perception   []Sensation      []Gross Motor Coordination             [] ROM           [] Delirium                   [] Motor Control      OT PLAN OF CARE   OT POC based on physician orders, patient diagnosis and results of clinical assessment     Frequency/Duration  2-4 days/wk for 2 weeks PRN   Specific OT Treatment Interventions to include:   ADL retraining/adapted techniques and AE recommendations to increase functional independence within precautions                    Energy conservation techniques to improve tolerance for selfcare routine   Functional transfer/mobility training/DME recommendations for increased independence, safety and fall prevention         Patient/family education to increase safety and functional independence             Environmental modifications for safe mobility and completion of ADLs                             Therapeutic activity to improve functional performance during ADLs. Therapeutic exercise to improve tolerance and functional strength for ADLs    Balance retraining/tolerance tasks for facilitation of postural control with dynamic challenges during ADLs . Positioning to improve functional independence  []      Recommended Adaptive Equipment: Adaptive equipment issued. Pt has elevated toilet seat and shower chair. Home Living: Pt lives alone, 1 story with 4 steps/rail    Bathroom setup: walk in shower, grab bars, shower seat    Equipment owned: wheeled walker      Prior Level of Function: assist with ADLs , assist  with IADLs; ambulated with walker      Pain Level: no pain reported. Cognition: Awake and alert. Cues for safety. Functional Assessment:  AM-PAC Daily Activity Raw Score: 16/24    Initial Eval Status  Date: 11/25/22 Treatment Status  Date:12/2/22  STGs = LTGs  Time frame: 10-14 days   Feeding Independent        Grooming Set-up ,seated  SBA for balance while standing at a sink. Independent    UB Dressing Set-up    Independent    LB Dressing Mod A  Pt instructed with use of adaptive equipment for LB dressing. Sock aid can be used if brace is off and knee is straight. Reacher instructed for donning pants and doffing clothing. Mod I    Bathing Min A  Pt has walk in shower with shower chair. States she is able to remove brace and keep knee straight while seated on shower chair. Long bathe sponge issued for LE bathing. Mod I    Toileting SBA  Seated on commode    Mod I    Bed Mobility  SBA  Supine <> sit  Supervision supine <> sit   Mod I    Functional Transfers CGA  Sit - stand from bed, BSC  SBA and cues for safety technique.      Mod I    Functional Mobility CGA,w/walker   Knee immobilizer on   SPT to<> from bed/BSC  Able to side step to [facility] - patient educated on TTWB - Petey Coreas of putting to much weight down so patient stayed NWB and hopped the short distance   Continue to reinforce TTWB precaution  SBA using w/w   Pt instructed with use of walker tray and energy conservation during homemaking activity. Mod I  with good tolerance    Balance Sitting:     Static:  Independent     Dynamic:SBA   Standing: CGA    Independent    Activity Tolerance No SOB  Fair   Good  with ADL activity        Comments:  Pt plans to return home with granddaughter present initially. She had some questions concerning homemaking. She was instructed with safety during homemaking. Recommended simple meals. Pt states she is looking into delivered meals. Insurance will not cover walker tray so she is having family purchase one for her. Walker safety using tray instructed. Also provided with information regarding walker tray and placement as not to prevent her from getting into her walker and prevent her from maintaining  TTWB. She did not have any further questions regarding AE use or home safety. Education/treatment:  ADL retraining with facilitation of movement to increase self care skills. Therapeutic activity to address balance and endurance for ADL and transfers. Pt education of walker safety, home safety, and transfer safety. Pt has made  progress towards set goals.        Time In: 11:00   Time Out: 11:39      Min Units   Therapeutic Ex 36325     Therapeutic Activities 31643     ADL/Self Care [id number] 39 3   Orthotic Management 66854     Neuro Re-Ed 16952     Non-Billable Time     TOTAL TIMED TREATMENT 39 1239 [facility] JUDITH/L [id number]

## 2022-12-03 VITALS
BODY MASS INDEX: 26.2 KG/M2 | SYSTOLIC BLOOD PRESSURE: 148 MMHG | RESPIRATION RATE: 16 BRPM | TEMPERATURE: 97.9 F | DIASTOLIC BLOOD PRESSURE: 55 MMHG | HEART RATE: 60 BPM | WEIGHT: 163 LBS | OXYGEN SATURATION: 97 % | HEIGHT: 66 IN

## 2022-12-03 PROCEDURE — 6370000000 HC RX 637 (ALT 250 FOR IP): Performed by: GENERAL ACUTE CARE HOSPITAL

## 2022-12-03 PROCEDURE — G0378 HOSPITAL OBSERVATION PER HR: HCPCS

## 2022-12-03 RX ADMIN — ASPIRIN 325 MG: 325 TABLET, COATED ORAL at 08:22

## 2022-12-05 NOTE — DISCHARGE SUMMARY
Discharge Summary     Patient ID:  Trudy Yuen  [id number]  69 y.o.  1945    Admit date: 11/25/2022    Discharge date and time: 12/3/2022 10:12 AM     Admitting Physician: Verona Singh DO     Discharge Physician: Same    Admission Diagnoses: Transverse fracture, right patella    Discharge Diagnoses: Same    Admission Condition: good    Discharged Condition: good    Procedure and Date: Open reduction internal fixation, right patella    Hospital Course: ORIF of the right patella was performed on the above date. Patient was subsequently admitted for overnight observation as well as planned discharge to a skilled nursing facility. There were no apparent complications during her stay. Physical therapy was consulted to help the patient with mobilization consistent with standard postoperative restrictions. However, there were multiple issues with obtaining the necessary insurance coverage for discharge to a skilled nursing facility. A peer to peer review was attempted, but this was denied and upheld. Patient was subsequently discharged on postop day #8 to previous residence with home health. Consults: Patient's PCP was consulted for medical management.     Significant Diagnostic Studies: Routine postoperative lab work    Discharge Exam:  BP (!) 148/55   Pulse 60   Temp 97.9 °F (36.6 °C) (Oral)   Resp 16   Ht 5' 6\" (1.676 m)   Wt 163 lb (73.9 kg)   SpO2 97%   BMI 26.31 kg/m²     General Appearance:    Alert, cooperative, no distress, appears stated age   Head:    Normocephalic, without obvious abnormality, atraumatic   Eyes:    PERRL, conjunctiva/corneas clear, EOM's intact, fundi     benign, both eyes   Ears:    Normal TM's and external ear canals, both ears   Nose:   Nares normal, septum midline, mucosa normal, no drainage    or sinus tenderness   Throat:   Lips, mucosa, and tongue normal; teeth and gums normal   Neck:   Supple, symmetrical, trachea midline, no adenopathy;     thyroid:  no enlargement/tenderness/nodules; no carotid    bruit or JVD   Back:     Symmetric, no curvature, ROM normal, no CVA tenderness   Lungs:     Clear to auscultation bilaterally, respirations unlabored   Chest Wall:    No tenderness or deformity    Heart:    Regular rate and rhythm, S1 and S2 normal, no murmur, rub   or gallop   Breast Exam:    No tenderness, masses, or nipple abnormality   Abdomen:     Soft, non-tender, bowel sounds active all four quadrants,     no masses, no organomegaly   Genitalia:    Normal female without lesion, discharge or tenderness   Rectal:    Normal tone ;guaiac negative stool   Extremities: Postoperative dressing clean dry and intact. Neurovascular function appropriate, consistent with preoperative status about the right lower extremity   Pulses:   2+ and symmetric all extremities   Skin:   Skin color, texture, turgor normal, no rashes or lesions   Lymph nodes:   Cervical, supraclavicular, and axillary nodes normal   Neurologic:   CNII-XII intact, normal strength, sensation and reflexes     throughout       Disposition: home    Patient Instructions:   Patient placed into a hinged knee brace locked in extension. She may maintain touchdown weightbearing on the operative right leg with the brace in place  I will see her in the office in 2 weeks, planning to unlock the brace and begin range of motion.   She was started on Lovenox for DVT prophylaxis and discharged home with pain medication    Follow-up with Dr. Danitza Egan in 2 weeks    Signed:  Nancy Gonzalez DO  12/5/2022  6:11 PM

## 2024-03-11 ENCOUNTER — HOSPITAL ENCOUNTER (OUTPATIENT)
Dept: GENERAL RADIOLOGY | Age: 79
Discharge: HOME | End: 2024-03-13
Payer: MEDICARE

## 2024-03-11 DIAGNOSIS — Z78.0 POSTMENOPAUSAL: ICD-10-CM

## 2024-03-11 PROCEDURE — 77080 DXA BONE DENSITY AXIAL: CPT

## 2025-04-16 ENCOUNTER — OFFICE VISIT (OUTPATIENT)
Dept: ENT CLINIC | Age: 80
End: 2025-04-16
Payer: MEDICARE

## 2025-04-16 VITALS
OXYGEN SATURATION: 96 % | SYSTOLIC BLOOD PRESSURE: 136 MMHG | DIASTOLIC BLOOD PRESSURE: 76 MMHG | BODY MASS INDEX: 26.76 KG/M2 | HEART RATE: 85 BPM | TEMPERATURE: 97.5 F | HEIGHT: 66 IN | WEIGHT: 166.5 LBS

## 2025-04-16 DIAGNOSIS — R09.81 CHRONIC NASAL CONGESTION: Primary | ICD-10-CM

## 2025-04-16 PROCEDURE — 99203 OFFICE O/P NEW LOW 30 MIN: CPT | Performed by: OTOLARYNGOLOGY

## 2025-04-16 PROCEDURE — 1159F MED LIST DOCD IN RCRD: CPT | Performed by: OTOLARYNGOLOGY

## 2025-04-16 PROCEDURE — 1123F ACP DISCUSS/DSCN MKR DOCD: CPT | Performed by: OTOLARYNGOLOGY

## 2025-04-16 RX ORDER — AZELASTINE 1 MG/ML
2 SPRAY, METERED NASAL 2 TIMES DAILY
Qty: 30 ML | Refills: 1 | Status: SHIPPED | OUTPATIENT
Start: 2025-04-16

## 2025-04-16 RX ORDER — CELECOXIB 200 MG/1
200 CAPSULE ORAL
COMMUNITY
Start: 2025-04-05

## 2025-04-28 ASSESSMENT — ENCOUNTER SYMPTOMS
COUGH: 0
SHORTNESS OF BREATH: 0
VOMITING: 0

## 2025-04-28 NOTE — PROGRESS NOTES
•  azelastine (ASTELIN) 0.1 % nasal spray, 2 sprays by Nasal route 2 times daily Use in each nostril as directed, Disp: 30 mL, Rfl: 1•  rosuvastatin (CRESTOR) 10 MG tablet, Take 1 tablet by mouth daily, Disp: , Rfl: •  sertraline (ZOLOFT) 25 MG tablet, Take 1 tablet by mouth at bedtime, Disp: , Rfl: •  Multiple Vitamins-Minerals (THERAPEUTIC MULTIVITAMIN-MINERALS) tablet, Take 1 tablet by mouth daily, Disp: , Rfl: •  Cholecalciferol (VITAMIN D3) 50 MCG (2000 UT) CAPS, Take by mouth daily, Disp: , Rfl: •  COLLAGEN PO, Take by mouth daily, Disp: , Rfl: •  calcium carbonate (OSCAL) 500 MG TABS tablet, Take 1 tablet by mouth daily, Disp: , Rfl: •  vitamin B-12 (CYANOCOBALAMIN) 100 MCG tablet, Take 0.5 tablets by mouth daily, Disp: , Rfl: •  aspirin 325 MG EC tablet, Take 1 tablet by mouth 2 times daily for 28 days, Disp: 56 tablet, Rfl: 0  Patient has no known allergies.  Social History     Tobacco Use   • Smoking status: Former     Types: Cigarettes   • Smokeless tobacco: Never   Vaping Use   • Vaping status: Never Used   Substance Use Topics   • Alcohol use: Never   • Drug use: Never     No family history on file.    Review of Systems   Constitutional:  Negative for chills and fever.   HENT:  Negative for ear discharge and hearing loss.    Respiratory:  Negative for cough and shortness of breath.    Cardiovascular:  Negative for chest pain and palpitations.   Gastrointestinal:  Negative for vomiting.   Skin:  Negative for rash.   Allergic/Immunologic: Negative for environmental allergies.   Neurological:  Negative for dizziness and headaches.   Hematological:  Does not bruise/bleed easily.   All other systems reviewed and are negative.      /76 (BP Site: Left Upper Arm, Patient Position: Sitting, BP Cuff Size: Medium Adult)   Pulse 85   Temp 97.5 °F (36.4 °C) (Temporal)   Ht 1.676 m (5' 6\")   Wt 75.5 kg (166 lb 8 oz)   SpO2 96%   BMI 26.87 kg/m²   Physical Exam  Vitals and nursing note reviewed.

## 2025-06-17 ENCOUNTER — OFFICE VISIT (OUTPATIENT)
Dept: ENT CLINIC | Age: 80
End: 2025-06-17
Payer: MEDICARE

## 2025-06-17 ENCOUNTER — PROCEDURE VISIT (OUTPATIENT)
Dept: AUDIOLOGY | Age: 80
End: 2025-06-17
Payer: MEDICARE

## 2025-06-17 VITALS
HEIGHT: 66 IN | RESPIRATION RATE: 16 BRPM | WEIGHT: 167.7 LBS | OXYGEN SATURATION: 94 % | SYSTOLIC BLOOD PRESSURE: 120 MMHG | DIASTOLIC BLOOD PRESSURE: 73 MMHG | BODY MASS INDEX: 26.95 KG/M2 | HEART RATE: 62 BPM

## 2025-06-17 DIAGNOSIS — Z01.812 PRE-PROCEDURAL LABORATORY EXAMINATIONS: ICD-10-CM

## 2025-06-17 DIAGNOSIS — H91.92 UNILATERAL HEARING LOSS, LEFT: Primary | ICD-10-CM

## 2025-06-17 DIAGNOSIS — H90.3 SENSORINEURAL HEARING LOSS, BILATERAL: ICD-10-CM

## 2025-06-17 DIAGNOSIS — H93.8X3 SENSATION OF FULLNESS IN BOTH EARS: Primary | ICD-10-CM

## 2025-06-17 PROCEDURE — 99214 OFFICE O/P EST MOD 30 MIN: CPT | Performed by: OTOLARYNGOLOGY

## 2025-06-17 PROCEDURE — 92557 COMPREHENSIVE HEARING TEST: CPT | Performed by: AUDIOLOGIST

## 2025-06-17 PROCEDURE — 92567 TYMPANOMETRY: CPT | Performed by: AUDIOLOGIST

## 2025-06-17 PROCEDURE — 1123F ACP DISCUSS/DSCN MKR DOCD: CPT | Performed by: OTOLARYNGOLOGY

## 2025-06-17 RX ORDER — AZELASTINE 1 MG/ML
2 SPRAY, METERED NASAL 2 TIMES DAILY
Qty: 30 ML | Refills: 1 | Status: SHIPPED | OUTPATIENT
Start: 2025-06-17

## 2025-06-17 NOTE — PROGRESS NOTES
This patient was referred for audiometric and tympanometric testing by Dr. Pagan due to ear fullness and a decrease in hearing sensitivity, bilaterally.  Patient feels her right ear is better, at this time.    Audiometry using pure tone air and bone conduction testing revealed a mild-to-moderate  sensorineural hearing loss, through the frequency range, bilaterally. Reliability was good. Speech reception thresholds were in good agreement with the pure tone averages, bilaterally. Speech discrimination scores were 96%, right ear and 80%, left ear at 70dBHL.    Tympanometry revealed normal middle ear peak pressure and compliance, bilaterally.  Ipsilateral acoustic reflexes were present, bilaterally at 1000Hz.    The results were reviewed with the patient and ordering provider.     Recommendations for follow up will be made pending ordering provider consult.    Steven Butterfield CCC/BRI  Audiologist  [id number]  NPI#:  [id number]      Electronically signed by Melissa Dhaliwal on 6/17/2025 at 11:15 AM

## 2025-06-24 ASSESSMENT — ENCOUNTER SYMPTOMS
COUGH: 0
SHORTNESS OF BREATH: 0
RHINORRHEA: 0
VOMITING: 0

## 2025-06-24 NOTE — PROGRESS NOTES
[facility]  CHINO GillO. Ms.Ed        Patient Name:  Mallorie Nelson  :  1945     CHIEF C/O:    Chief Complaint   Patient presents with   • Follow-up     6 weeks chronic pansinusitis    Phlegm in throat since starting astelin.        HISTORY OBTAINED FROM:  patient    HISTORY OF PRESENT ILLNESS:       Mallorie is a 80 y.o. year old female, here today for follow up of:         History of Present Illness  The patient presents for evaluation of ear fullness.    He reports a cessation of dizziness but continues to experience a sensation of fullness in both ears, without any associated pain. He has been using a nasal spray, administering 2 sprays in each nostril at night, which has improved his sleep quality. He also notes the presence of phlegm in his throat, which he expels through coughing, providing relief for the remainder of the day. He no longer experiences lightheadedness upon transitioning from a seated to standing position.         Past Medical History:   Diagnosis Date   • Arthritis    • Fracture of right patella    • Hx of blood clots    • Hyperlipidemia    • PONV (postoperative nausea and vomiting)      Past Surgical History:   Procedure Laterality Date   • CATARACT EXTRACTION EXTRACAPSULAR W/ INTRAOCULAR LENS IMPLANTATION Bilateral    • COLONOSCOPY     • PATELLA FRACTURE SURGERY Right 2022    RIGHT PATELLA OPEN REDUCTION INTERNAL FIXATION  ++PNB++ performed by Raad Benjamin DO at [facility] OR   • TONSILLECTOMY         Current Outpatient Medications:   •  azelastine (ASTELIN) 0.1 % nasal spray, 2 sprays by Nasal route 2 times daily Use in each nostril as directed, Disp: 30 mL, Rfl: 1  •  celecoxib (CELEBREX) 200 MG capsule, 1 capsule, Disp: , Rfl:   •  rosuvastatin (CRESTOR) 10 MG tablet, Take 1 tablet by mouth daily, Disp: , Rfl:   •  sertraline (ZOLOFT) 25 MG tablet, Take 1 tablet by mouth at bedtime, Disp: , Rfl:   •  Multiple Vitamins-Minerals (THERAPEUTIC MULTIVITAMIN-MINERALS)

## 2025-07-08 ENCOUNTER — TELEPHONE (OUTPATIENT)
Dept: ENT CLINIC | Age: 80
End: 2025-07-08

## 2025-07-08 DIAGNOSIS — H91.92 UNILATERAL HEARING LOSS, LEFT: Primary | ICD-10-CM

## 2025-07-08 DIAGNOSIS — H91.92 UNILATERAL HEARING LOSS, LEFT: ICD-10-CM

## 2025-07-08 DIAGNOSIS — Z01.812 PRE-PROCEDURAL LABORATORY EXAMINATIONS: Primary | ICD-10-CM

## 2025-07-08 NOTE — TELEPHONE ENCOUNTER
Patient call office stating she is scheduled for MRI 21st and she would like a script to take before MRI to keep her calm. Please advise

## 2025-07-10 RX ORDER — DIAZEPAM 2 MG/1
5 TABLET ORAL EVERY 8 HOURS PRN
Qty: 2 TABLET | Refills: 0 | Status: SHIPPED | OUTPATIENT
Start: 2025-07-10 | End: 2025-07-12

## 2025-07-14 ENCOUNTER — HOSPITAL ENCOUNTER (OUTPATIENT)
Age: 80
Discharge: HOME | End: 2025-07-14
Payer: MEDICARE

## 2025-07-14 DIAGNOSIS — Z01.812 PRE-PROCEDURAL LABORATORY EXAMINATIONS: ICD-10-CM

## 2025-07-14 LAB
BUN SERPL-MCNC: 15 MG/DL (ref 8–23)
CREAT SERPL-MCNC: 0.9 MG/DL (ref 0.5–1)
GFR, ESTIMATED: 67 ML/MIN/1.73M2

## 2025-07-14 PROCEDURE — 36415 COLL VENOUS BLD VENIPUNCTURE: CPT

## 2025-07-14 PROCEDURE — 82565 ASSAY OF CREATININE: CPT

## 2025-07-14 PROCEDURE — 84520 ASSAY OF UREA NITROGEN: CPT

## (undated) DEVICE — DOUBLE BASIN SET: Brand: MEDLINE INDUSTRIES, INC.

## (undated) DEVICE — C-ARMOR C-ARM EQUIPMENT COVERS CLEAR STERILE UNIVERSAL FIT 12 PER CASE: Brand: C-ARMOR

## (undated) DEVICE — KIT SURG W7XL11IN 2 PKT UNTREATED NA

## (undated) DEVICE — GLOVE ORANGE PI 8   MSG9080

## (undated) DEVICE — SOLUTION IV IRRIG WATER 1000ML POUR BRL 2F7114

## (undated) DEVICE — STRIP,CLOSURE,WOUND,MEDI-STRIP,1/2X4: Brand: MEDLINE

## (undated) DEVICE — PACK PROCEDURE SURG GEN CUST

## (undated) DEVICE — SYRINGE IRRIG 60ML SFT PLIABLE BLB EZ TO GRP 1 HND USE W/

## (undated) DEVICE — DRESSING,GAUZE,XEROFORM,CURAD,1"X8",ST: Brand: CURAD

## (undated) DEVICE — PADDING CAST W6INXL4YD COT LO LINTING WYTEX

## (undated) DEVICE — 3M™ COBAN™ NL STERILE NON-LATEX SELF-ADHERENT WRAP, 2084S, 4 IN X 5 YD (10 CM X 4,5 M), 18 ROLLS/CASE: Brand: 3M™ COBAN™

## (undated) DEVICE — GOWN,SIRUS,FABRNF,XL,20/CS: Brand: MEDLINE

## (undated) DEVICE — 4-PORT MANIFOLD: Brand: NEPTUNE 2

## (undated) DEVICE — BNDG,ELSTC,MATRIX,STRL,6"X5YD,LF,HOOK&LP: Brand: MEDLINE

## (undated) DEVICE — TOTAL KNEE PK

## (undated) DEVICE — 3M™ IOBAN™ 2 ANTIMICROBIAL INCISE DRAPE 6650EZ: Brand: IOBAN™ 2

## (undated) DEVICE — GUIDEWIRE ARTHSCP DIA1.35MM FOR PAT FRAC SYS

## (undated) DEVICE — SOLUTION IV IRRIG POUR BRL 0.9% SODIUM CHL 2F7124

## (undated) DEVICE — DRESSING HYDROFIBER AQUACEL AG ADVANTAGE 3.5X10 IN

## (undated) DEVICE — 3M™ STERI-DRAPE™ U-DRAPE 1015: Brand: STERI-DRAPE™

## (undated) DEVICE — TUBING, SUCTION, 9/32" X 10', STRAIGHT: Brand: MEDLINE

## (undated) DEVICE — SYRINGE MED 50ML LUERLOCK TIP

## (undated) DEVICE — BANDAGE COMPR W6INXL12FT SMOOTH FOR LIMB EXSANG ESMARCH

## (undated) DEVICE — BIT DRL DIA2.6MM CANN FOR ANK FRAC MGMT SYS

## (undated) DEVICE — SUTURE FIBERTAPE W2MMXL17IN BLU STR NDL BRAID POLYBLEND AR723717LN

## (undated) DEVICE — ELECTRODE PT RET AD L9FT HI MOIST COND ADH HYDRGEL CORDED

## (undated) DEVICE — INTENDED FOR TISSUE SEPARATION, AND OTHER PROCEDURES THAT REQUIRE A SHARP SURGICAL BLADE TO PUNCTURE OR CUT.: Brand: BARD-PARKER ® STAINLESS STEEL BLADES

## (undated) DEVICE — ZIMMER® STERILE DISPOSABLE TOURNIQUET CUFF WITH PLC, DUAL PORT, SINGLE BLADDER, 30 IN. (76 CM)

## (undated) DEVICE — GAUZE,SPONGE,4"X4",8PLY,STRL,LF,10/TRAY: Brand: MEDLINE

## (undated) DEVICE — ADHESIVE SKIN CLSR 0.7ML TOP DERMBND ADV

## (undated) DEVICE — C-ARM: Brand: UNBRANDED

## (undated) DEVICE — DRAPE,REIN 53X77,STERILE: Brand: MEDLINE

## (undated) DEVICE — GLOVE ORTHO 8   MSG9480

## (undated) DEVICE — SYRINGE MED 30ML STD CLR PLAS LUERLOCK TIP N CTRL DISP

## (undated) DEVICE — Device

## (undated) DEVICE — CHLORAPREP 26ML ORANGE